# Patient Record
Sex: FEMALE | Race: BLACK OR AFRICAN AMERICAN | Employment: PART TIME | URBAN - METROPOLITAN AREA
[De-identification: names, ages, dates, MRNs, and addresses within clinical notes are randomized per-mention and may not be internally consistent; named-entity substitution may affect disease eponyms.]

---

## 2018-04-04 ENCOUNTER — HOSPITAL ENCOUNTER (OUTPATIENT)
Dept: WOMENS IMAGING | Age: 53
Discharge: HOME | End: 2018-04-04
Attending: OBSTETRICS & GYNECOLOGY | Admitting: OBSTETRICS & GYNECOLOGY

## 2018-04-04 DIAGNOSIS — Z12.31 VISIT FOR SCREENING MAMMOGRAM: ICD-10-CM

## 2018-10-26 ENCOUNTER — HOSPITAL ENCOUNTER (EMERGENCY)
Age: 53
Discharge: HOME | End: 2018-10-26
Attending: EMERGENCY MEDICINE
Payer: COMMERCIAL

## 2018-10-26 VITALS
TEMPERATURE: 97.8 F | WEIGHT: 196 LBS | HEIGHT: 62 IN | BODY MASS INDEX: 36.07 KG/M2 | HEART RATE: 94 BPM | OXYGEN SATURATION: 100 % | RESPIRATION RATE: 16 BRPM | DIASTOLIC BLOOD PRESSURE: 85 MMHG | SYSTOLIC BLOOD PRESSURE: 118 MMHG

## 2018-10-26 DIAGNOSIS — R21 RASH AND OTHER NONSPECIFIC SKIN ERUPTION: Primary | ICD-10-CM

## 2018-10-26 PROCEDURE — 99282 EMERGENCY DEPT VISIT SF MDM: CPT

## 2018-10-26 RX ORDER — PERMETHRIN 50 MG/G
CREAM TOPICAL
Qty: 1 TUBE | Refills: 0 | Status: SHIPPED | OUTPATIENT
Start: 2018-10-26

## 2018-10-26 RX ORDER — HYDROXYZINE 50 MG/1
25 TABLET, FILM COATED ORAL 3 TIMES DAILY PRN
Qty: 20 TABLET | Refills: 0 | Status: SHIPPED | OUTPATIENT
Start: 2018-10-26 | End: 2018-11-05

## 2018-10-26 ASSESSMENT — PAIN DESCRIPTION - PAIN TYPE: TYPE: ACUTE PAIN

## 2018-10-26 ASSESSMENT — PAIN DESCRIPTION - DESCRIPTORS: DESCRIPTORS: ITCHING

## 2018-10-26 NOTE — ED TRIAGE NOTES
Pt presents to ED with complaints of rash near breast and upper back and left shoulder region. Pt states she started tresiba back on Monday but is not a new medication. Denies any new exposure to detergents or soaps. Pt states she was around her sister's dogs so has concerns that the rash may be from them. States they itch. No sores in mouth. No rash to hands and soles of feet. No swelling. No respiratory distress.

## 2018-10-26 NOTE — ED PROVIDER NOTES
Triage Chief Complaint:   Rash (reports rash to chest and back; onset Monday; states she switched back to [place] on Monday but states she thinks the rash was there prior; states she has been on [place] before but never had any difficulty with the medication before; states she washed her face with new soap but no new exposure that she is aware of )    [place]:  Radha Flores is a 47 y.o. female that presents with concern for a rash. It is near the left breast and upper left back and left shoulder. She noted it on Monday, had been petting her sister's dogs and thought maybe it came from them. No known bites. She was started back on her [place] on Monday, been on this previously and has not had a rash with that then. She noted the rash she believes before she restarted it. No nausea or vomiting. No fevers. No mouth lesions. No hand or palm lesions. No foot lesions. No sloughing of skin. It is very itchy. It is not painful. No ulcers or blisters. She used a new facial soap but otherwise no new soaps or detergents. No new exposures that she knows of otherwise. Has not taken anything for the itchiness. 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 Normal chamber sizes. LVH with normal LVSF. Mild MR/TR. Normal sized abdominal aorta at 2.1cm.  Hyperlipidemia     Hypertension     Obesity (BMI 30-39. 9)     Shoulder pain, bilateral     SOB (shortness of breath)      History reviewed. No pertinent surgical history. History reviewed. No pertinent family history.   Social History     Social History    Marital status:      Spouse name: N/A    Number of children: N/A    Years of education: N/A

## 2018-12-02 ENCOUNTER — HOSPITAL ENCOUNTER (EMERGENCY)
Age: 53
Discharge: HOME | End: 2018-12-02
Payer: COMMERCIAL

## 2018-12-02 VITALS
WEIGHT: 187 LBS | BODY MASS INDEX: 34.41 KG/M2 | OXYGEN SATURATION: 98 % | RESPIRATION RATE: 16 BRPM | HEIGHT: 62 IN | SYSTOLIC BLOOD PRESSURE: 167 MMHG | DIASTOLIC BLOOD PRESSURE: 88 MMHG | TEMPERATURE: 98.8 F | HEART RATE: 94 BPM

## 2018-12-02 DIAGNOSIS — R35.0 URINARY FREQUENCY: ICD-10-CM

## 2018-12-02 DIAGNOSIS — M54.50 ACUTE LOW BACK PAIN WITHOUT SCIATICA, UNSPECIFIED BACK PAIN LATERALITY: Primary | ICD-10-CM

## 2018-12-02 LAB
AMORPHOUS: ABNORMAL /HPF
BACTERIA: NEGATIVE /HPF
BILIRUBIN URINE: NEGATIVE MG/DL
BLOOD, URINE: NEGATIVE
CLARITY: ABNORMAL
COLOR: YELLOW
GLUCOSE, URINE: NEGATIVE MG/DL
KETONES, URINE: NEGATIVE MG/DL
LEUKOCYTE ESTERASE, URINE: ABNORMAL
MUCUS: ABNORMAL HPF
NITRITE URINE, QUANTITATIVE: NEGATIVE
PH, URINE: 8 (ref 5–8)
PROTEIN UA: 30 MG/DL
RBC URINE: 5 /HPF (ref 0–6)
SPECIFIC GRAVITY UA: 1.03 (ref 1–1.03)
SQUAMOUS EPITHELIAL: 1 /HPF
TRICHOMONAS: ABNORMAL /HPF
UROBILINOGEN, URINE: 1 MG/DL (ref 0.2–1)
WBC UA: 2 /HPF (ref 0–5)

## 2018-12-02 PROCEDURE — 99283 EMERGENCY DEPT VISIT LOW MDM: CPT

## 2018-12-02 PROCEDURE — 87086 URINE CULTURE/COLONY COUNT: CPT

## 2018-12-02 PROCEDURE — 81001 URINALYSIS AUTO W/SCOPE: CPT

## 2018-12-02 RX ORDER — METHOCARBAMOL 500 MG/1
500 TABLET, FILM COATED ORAL 3 TIMES DAILY
Qty: 9 TABLET | Refills: 0 | Status: SHIPPED | OUTPATIENT
Start: 2018-12-02

## 2018-12-02 RX ORDER — NAPROXEN 500 MG/1
500 TABLET ORAL 2 TIMES DAILY PRN
Qty: 20 TABLET | Refills: 0 | Status: SHIPPED | OUTPATIENT
Start: 2018-12-02

## 2018-12-02 RX ORDER — LIDOCAINE 50 MG/G
1 PATCH TOPICAL DAILY
Qty: 3 PATCH | Refills: 0 | Status: SHIPPED | OUTPATIENT
Start: 2018-12-02

## 2018-12-02 ASSESSMENT — PAIN DESCRIPTION - ORIENTATION: ORIENTATION: MID

## 2018-12-02 ASSESSMENT — PAIN DESCRIPTION - LOCATION: LOCATION: BACK

## 2018-12-02 ASSESSMENT — PAIN DESCRIPTION - PAIN TYPE: TYPE: ACUTE PAIN

## 2018-12-02 ASSESSMENT — PAIN SCALES - GENERAL: PAINLEVEL_OUTOF10: 8

## 2018-12-02 NOTE — ED TRIAGE NOTES
Pt denies any injury, states her back pain started one morning 1 week ago and has not been relieved.  Pt has tried analgesics without relief

## 2018-12-03 NOTE — ED PROVIDER NOTES
status: Never Smoker    Smokeless tobacco: Never Used    Alcohol use No    Drug use: No    Sexual activity: Not on file     Other Topics Concern    Not on file     Social History Narrative    No narrative on file       PHYSICAL EXAM    VITAL SIGNS: BP (!) 167/88   Pulse 94   Temp 98.8 °F (37.1 °C) (Oral)   Resp 16   Ht 5' 2\" (1.575 m)   Wt 187 lb (84.8 kg)   LMP 07/09/2016   SpO2 98%   BMI 34.20 kg/m²    Patient was noted to be afebrile    Constitutional:  Well developed, well nourished. HENT:  Atraumatic, moist mucus membranes. Eyes:  No orbital trauma. No scleral icterus. Neck: No JVD, supple. No enlarged lymph nodes. Cardiovascular:  Normal rate, regular rhythm, no murmurs/rubs/gallops  Respiratory:  No respiratory distress, normal breath sounds. Abdomen: Bowel sounds normal, abdomen soft, no tenderness, no masses. Musculoskeletal:  No edema, no deformities. Back:   - No gross deformity, swelling, or discoloration.    - + left lower Paralumbar tenderness without masses, fluctuance, warmth, or skin changes.  - No localized midline bony tenderness, but rather patient does have diffuse inferior lumbar tenderness to palpation without point tenderness. There is no midline cervical or thoracic tenderness to palpation.  - No change in pain with forward flexion, but increased pain with lateral rotation.  - SLR test negative. No CVA tenderness to percussion    Integument:  Well hydrated, no rash, no pallor. Neurologic:    No obvious neurological deficits. Patient moves without difficulty or weakness. Motor & sensation intact in bilateral lower extremities. Strength is 5/5 in bilateral lower extremities. Intact sensation of lower legs, including normal sensation to light touch of inner thighs, distal legs, feet  5/5 strength adduction thighs, flexion/extension knees, bilateral foot dorsiflexion/extension, all toe extension/curling toes.   2+ patellar reflexes bilaterally    Vascular: Distal pulses and capillary refill intact in bilateral lower extremities      LABS:  Results for orders placed or performed during the hospital encounter of 12/02/18   Urinalysis   Result Value Ref Range    Color, UA YELLOW UYELL    Clarity, UA HAZY (A) CLEAR    Glucose, Urine NEGATIVE NEG MG/DL    Bilirubin Urine NEGATIVE NEG MG/DL    Ketones, Urine NEGATIVE NEG MG/DL    Specific Gravity, UA 1.028 1.001 - 1.035    Blood, Urine NEGATIVE NEG    pH, Urine 8.0 5.0 - 8.0    Protein, UA 30 (A) NEG MG/DL    Urobilinogen, Urine 1 0.2 - 1.0 MG/DL    Nitrite Urine, Quantitative NEGATIVE NEG    Leukocyte Esterase, Urine SMALL (A) NEG    RBC, UA 5 0 - 6 /HPF    WBC, UA 2 0 - 5 /HPF    Bacteria, UA NEGATIVE NEG /HPF    Squam Epithel, UA 1 /HPF    Mucus, UA RARE (A) NEG HPF    Trichomonas, UA NONE SEEN NOSEE /HPF    Amorphous, UA RARE /HPF     Urine culture in process    RADIOLOGY/PROCEDURES    No orders to display            ED COURSE & MEDICAL DECISION MAKING       Vital signs and nursing notes reviewed during ED course. I have independently evaluated this patient. Supervising physician present in the Emergency Department, available for consultation, throughout entirety of patient care. History and exam is consistent with musculoskeletal back pain. While in the ED today, Did offer patient imaging today to rule out compression fracture but she declines. I do have low suspicion for this as patient has no history of osteopenia, no history of trauma, and has no point tenderness of the lumbar spine, but rather diffuse and paralumbar tenderness to palpation. Patient would prefer to try conservative treatment and if no improvement follow up with her PCP for outpatient imaging. Do find this plan reasonable. Patient is neurologically intact on exam.  No signs of cauda equina today. Presentation not consistent with epidural abscess, kidney stone, or pyelonephritis.   Urinalysis obtained and reveals no evidence of urinary tract

## 2018-12-04 LAB
CULTURE: NORMAL
Lab: NORMAL
REPORT STATUS: NORMAL
SPECIMEN: NORMAL
TOTAL COLONY COUNT: NORMAL

## 2019-02-14 ENCOUNTER — HOSPITAL ENCOUNTER (OUTPATIENT)
Dept: PHYSICAL THERAPY | Age: 54
Setting detail: THERAPIES SERIES
Discharge: HOME | End: 2019-02-14
Payer: COMMERCIAL

## 2019-02-14 PROCEDURE — 97161 PT EVAL LOW COMPLEX 20 MIN: CPT

## 2019-02-14 ASSESSMENT — PAIN DESCRIPTION - PROGRESSION: CLINICAL_PROGRESSION: GRADUALLY IMPROVING

## 2019-02-14 ASSESSMENT — PAIN DESCRIPTION - PAIN TYPE: TYPE: ACUTE PAIN

## 2019-02-14 ASSESSMENT — PAIN SCALES - GENERAL: PAINLEVEL_OUTOF10: 0

## 2019-02-14 ASSESSMENT — PAIN DESCRIPTION - FREQUENCY: FREQUENCY: INTERMITTENT

## 2019-02-14 ASSESSMENT — PAIN DESCRIPTION - ORIENTATION: ORIENTATION: MID

## 2019-02-14 ASSESSMENT — PAIN DESCRIPTION - LOCATION: LOCATION: BACK

## 2019-02-15 ASSESSMENT — PAIN DESCRIPTION - FREQUENCY: FREQUENCY: INTERMITTENT

## 2019-02-15 ASSESSMENT — PAIN DESCRIPTION - PAIN TYPE: TYPE: ACUTE PAIN

## 2019-02-15 ASSESSMENT — PAIN DESCRIPTION - ORIENTATION: ORIENTATION: MID

## 2019-02-15 ASSESSMENT — PAIN DESCRIPTION - LOCATION: LOCATION: BACK

## 2019-02-15 ASSESSMENT — PAIN DESCRIPTION - PROGRESSION: CLINICAL_PROGRESSION: GRADUALLY IMPROVING

## 2019-02-17 ENCOUNTER — HOSPITAL ENCOUNTER (EMERGENCY)
Age: 54
Discharge: HOME | End: 2019-02-17
Payer: COMMERCIAL

## 2019-02-17 VITALS
RESPIRATION RATE: 17 BRPM | HEART RATE: 78 BPM | SYSTOLIC BLOOD PRESSURE: 126 MMHG | DIASTOLIC BLOOD PRESSURE: 73 MMHG | BODY MASS INDEX: 33.13 KG/M2 | OXYGEN SATURATION: 99 % | TEMPERATURE: 98.6 F | HEIGHT: 63 IN | WEIGHT: 187 LBS

## 2019-02-17 DIAGNOSIS — R30.0 DYSURIA: Primary | ICD-10-CM

## 2019-02-17 LAB
BACTERIA: ABNORMAL /HPF
BILIRUBIN URINE: NEGATIVE MG/DL
BLOOD, URINE: NEGATIVE
CLARITY: CLEAR
COLOR: YELLOW
GLUCOSE, URINE: NEGATIVE MG/DL
KETONES, URINE: NEGATIVE MG/DL
LEUKOCYTE ESTERASE, URINE: ABNORMAL
MUCUS: ABNORMAL HPF
NITRITE URINE, QUANTITATIVE: NEGATIVE
PH, URINE: 6 (ref 5–8)
PREGNANCY, URINE: NEGATIVE
PROTEIN UA: NEGATIVE MG/DL
RBC URINE: 1 /HPF (ref 0–6)
SPECIFIC GRAVITY UA: 1.03 (ref 1–1.03)
SPECIFIC GRAVITY, URINE: 1.03 (ref 1–1.03)
SQUAMOUS EPITHELIAL: 1 /HPF
TRICHOMONAS: ABNORMAL /HPF
UROBILINOGEN, URINE: NORMAL MG/DL (ref 0.2–1)
WBC UA: 3 /HPF (ref 0–5)

## 2019-02-17 PROCEDURE — 99283 EMERGENCY DEPT VISIT LOW MDM: CPT

## 2019-02-17 PROCEDURE — 81001 URINALYSIS AUTO W/SCOPE: CPT

## 2019-02-17 PROCEDURE — 81025 URINE PREGNANCY TEST: CPT

## 2019-02-17 RX ORDER — PHENAZOPYRIDINE HYDROCHLORIDE 200 MG/1
200 TABLET, FILM COATED ORAL 3 TIMES DAILY PRN
Qty: 6 TABLET | Refills: 0 | Status: SHIPPED | OUTPATIENT
Start: 2019-02-17 | End: 2019-02-19

## 2019-02-17 RX ORDER — CEPHALEXIN 500 MG/1
500 CAPSULE ORAL 2 TIMES DAILY
Qty: 14 CAPSULE | Refills: 0 | Status: SHIPPED | OUTPATIENT
Start: 2019-02-17 | End: 2019-02-24

## 2020-10-01 ENCOUNTER — APPOINTMENT (OUTPATIENT)
Dept: GENERAL RADIOLOGY | Age: 55
End: 2020-10-01
Payer: COMMERCIAL

## 2020-10-01 ENCOUNTER — HOSPITAL ENCOUNTER (EMERGENCY)
Age: 55
Discharge: HOME | End: 2020-10-02
Payer: COMMERCIAL

## 2020-10-01 VITALS
SYSTOLIC BLOOD PRESSURE: 140 MMHG | RESPIRATION RATE: 14 BRPM | DIASTOLIC BLOOD PRESSURE: 69 MMHG | TEMPERATURE: 98.3 F | OXYGEN SATURATION: 100 % | HEART RATE: 87 BPM

## 2020-10-01 PROCEDURE — 99283 EMERGENCY DEPT VISIT LOW MDM: CPT

## 2020-10-01 PROCEDURE — 73030 X-RAY EXAM OF SHOULDER: CPT

## 2020-10-01 ASSESSMENT — PAIN DESCRIPTION - ORIENTATION: ORIENTATION: RIGHT

## 2020-10-01 ASSESSMENT — PAIN DESCRIPTION - PAIN TYPE: TYPE: ACUTE PAIN

## 2020-10-01 ASSESSMENT — PAIN DESCRIPTION - LOCATION: LOCATION: SHOULDER

## 2020-10-02 NOTE — ED NOTES
Discharge instructions understood as stated by patient. All questions answered.      Janie Marcos RN  10/02/20 0476

## 2020-10-02 NOTE — ED PROVIDER NOTES
EMERGENCY DEPARTMENT ENCOUNTER      PCP: [id number] [facility]    Chief Complaint   Patient presents with    Shoulder Injury     right       This patient was not evaluated by the attending physician. I have independently evaluated this patient. HPI    Batool Siddiqui is a 48 y.o. female who presents with right shoulder pain. Context is patient lifted a heavy box up onto a shelf and then used her right hand to move the box while it was above her head and felt immediate pain in her right shoulder. Onset was a few hours prior to arrival tonight. The duration of pain has been constant since the onset. The pain does not radiate. The pain worsens with right shoulder movement and palpation. Patient denies fever, chills, distal numbness, tingling, weakness, or functional deficit. Patient denies other injuries. Patient denies chest pain, shortness of breath, neck pain. REVIEW OF SYSTEMS    General: Denies fever or chills  Cardiac: Denies chest pain  Pulmonary: Denies shortness of breath, wheezes, or difficulty breathing  GI: Denies abdominal pain, vomiting, or diarrhea  : No dysuria or hematuria  Musculoskeletal: See HPI; Denies any other musculoskeletal injuries, including chest wall and back. All other review of systems are negative  See HPI and nursing notes for additional information     PAST MEDICAL & SURGICAL HISTORY    Past Medical History:   Diagnosis Date    Diabetes mellitus ([facility] [address]     History of nuclear stress test 06/23/2016    cardiolite-normal,EF65%    Hx of echocardiogram 06/23/2016    EF >55%. Normal chamber sizes. LVH with normal LVSF. Mild MR/TR. Normal sized abdominal aorta at 2.1cm.  Hyperlipidemia     Hypertension     Obesity (BMI 30-39. 9)     Shoulder pain, bilateral     SOB (shortness of breath)      History reviewed. No pertinent surgical history.     CURRENT MEDICATIONS    Current Outpatient Rx   Medication Sig Dispense Refill    lidocaine (LIDODERM) 5 % Place 1 patch onto the skin daily 12 hours on, 12 hours off. 3 patch 0    methocarbamol (ROBAXIN) 500 MG tablet Take 1 tablet by mouth 3 times daily As needed for muscle spasm. 9 tablet 0    naproxen (NAPROSYN) 500 MG tablet Take 1 tablet by mouth 2 times daily as needed for Pain 20 tablet 0    permethrin (ELIMITE) 5 % cream Apply topically as directed 1 Tube 0    hydrocortisone acetate 1 % CREA Apply 1 Tube topically three times daily Until rash resolves, no longer than 3 weeks. 1 Tube 0    insulin NPH (HUMULIN N) 100 UNIT/ML injection vial Inject 50 Units into the skin 2 times daily (before meals)      insulin regular (HUMULIN R) 100 UNIT/ML injection Inject 70 Units into the skin See Admin Instructions With lunch      losartan (COZAAR) 50 MG tablet Take 50 mg by mouth 2 times daily      simvastatin (ZOCOR) 20 MG tablet Take 20 mg by mouth nightly      glucose blood VI test strips (ASCENSIA AUTODISC VI;ONE TOUCH ULTRA TEST VI) strip 1 each by In Vitro route daily As needed.          ALLERGIES    Allergies   Allergen Reactions    Nuts [Peanut-Containing Drug Products] Anaphylaxis    Shellfish-Derived Products Anaphylaxis    Lisinopril Other (See Comments)     lathargic    Metformin And Related Diarrhea       SOCIAL & FAMILY HISTORY    Social History     Socioeconomic History    Marital status:      Spouse name: None    Number of children: None    Years of education: None    Highest education level: None   Occupational History    None   Social Needs    Financial resource strain: None    Food insecurity     Worry: None     Inability: None    Transportation needs     Medical: None     Non-medical: None   Tobacco Use    Smoking status: Never Smoker    Smokeless tobacco: Never Used   Substance and Sexual Activity    Alcohol use: No     Alcohol/week: 0.0 standard drinks    Drug use: No    Sexual activity: None   Lifestyle    Physical activity     Days per week: None Minutes per session: None    Stress: None   Relationships    Social connections     Talks on phone: None     Gets together: None     Attends Gnosticist service: None     Active member of club or organization: None     Attends meetings of clubs or organizations: None     Relationship status: None    Intimate partner violence     Fear of current or ex partner: None     Emotionally abused: None     Physically abused: None     Forced sexual activity: None   Other Topics Concern    None   Social History Narrative    None     History reviewed. No pertinent family history. PHYSICAL EXAM    VITAL SIGNS: BP (!) 140/69   Pulse 87   Temp 98.3 °F (36.8 °C) (Oral)   Resp 14   LMP 07/09/2016   SpO2 100%   Constitutional:  Well developed, Appears comfortable    Musculoskeletal:    Neck:  No gross swelling or discoloration on inspection. No tenderness to palpation or palpable defect. Full range of motion without pain. Right Shoulder Exam:   -Inspection:   No obvious defects, deformities, or discoloration. Skin intact   - Palpation: No swelling     No redness, or warmth     + tenderness to palpation of right AC joint as well as trapezius musculature. No clavicular, scapular, humerus tenderness to palpation        -ROM:   Full active ROM   -Provocative tests:  Negative Neer, Barriga Little, Drop Arm    No swelling, discoloration, tenderness to palpation, or range of motion deficit of the ipsilateral elbow. Strength 5/5 and extremity is distally neurovascularly intact. Vascular: Radial pulses 2+ and equal bilaterally  Integument:  Well hydrated, no rash   Neurologic:  Alert and oriented. Distal sensation intact. No functional deficits of elbows, wrists, hands, or fingers. Psychiatric: Cooperative, pleasant affect        RADIOLOGY/PROCEDURES    XR SHOULDER RIGHT (MIN 2 VIEWS)   Final Result   No acute abnormality.                     ED COURSE & MEDICAL DECISION MAKING       Vital signs and nursing notes

## 2020-11-18 ENCOUNTER — OFFICE VISIT (OUTPATIENT)
Dept: ORTHOPEDIC SURGERY | Age: 55
End: 2020-11-18
Payer: COMMERCIAL

## 2020-11-18 VITALS
HEART RATE: 74 BPM | HEIGHT: 63 IN | RESPIRATION RATE: 16 BRPM | WEIGHT: 187 LBS | OXYGEN SATURATION: 97 % | BODY MASS INDEX: 33.13 KG/M2

## 2020-11-18 PROCEDURE — G8427 DOCREV CUR MEDS BY ELIG CLIN: HCPCS | Performed by: PHYSICIAN ASSISTANT

## 2020-11-18 PROCEDURE — 99242 OFF/OP CONSLTJ NEW/EST SF 20: CPT | Performed by: PHYSICIAN ASSISTANT

## 2020-11-18 PROCEDURE — G8417 CALC BMI ABV UP PARAM F/U: HCPCS | Performed by: PHYSICIAN ASSISTANT

## 2020-11-18 PROCEDURE — 20610 DRAIN/INJ JOINT/BURSA W/O US: CPT | Performed by: PHYSICIAN ASSISTANT

## 2020-11-18 PROCEDURE — G8484 FLU IMMUNIZE NO ADMIN: HCPCS | Performed by: PHYSICIAN ASSISTANT

## 2020-11-18 NOTE — PATIENT INSTRUCTIONS
Continue weight bear as tolerated  Continue range of motion  Ice and elevate as needed  Tylenol or Motrin for pain  Injection given today in the office   Rest both knee for the next 24-48 hours  Follow up as needed

## 2020-11-18 NOTE — PROGRESS NOTES
Review of Systems   Constitutional: Negative. HENT: Negative. Eyes: Negative. Respiratory: Negative. Cardiovascular: Negative. Gastrointestinal: Negative. Genitourinary: Negative. Musculoskeletal: Positive for arthralgias, gait problem and joint swelling. Skin: Negative. Psychiatric/Behavioral: Negative. HPI:  Nabor Newsome is a 48 y.o. female who complains of bilateral knee pain aching in nature. The left knee is hurting greater than the right knee today. She rates her pain at a 6/10. She states that in the past she has had fluid drained off of her knee but she cannot remember which one actually. No surgeries and no cortisone injections recently. The patient was referred by Chantelle Benítez MD   for Bilateral knee pain. Past Medical History:   Diagnosis Date    Diabetes mellitus ([facility] [address]     History of nuclear stress test 06/23/2016    cardiolite-normal,EF65%    Hx of echocardiogram 06/23/2016    EF >55%. Normal chamber sizes. LVH with normal LVSF. Mild MR/TR. Normal sized abdominal aorta at 2.1cm.  Hyperlipidemia     Hypertension     Obesity (BMI 30-39. 9)     Shoulder pain, bilateral     SOB (shortness of breath)        No past surgical history on file. No family history on file.     Social History     Socioeconomic History    Marital status:      Spouse name: None    Number of children: None    Years of education: None    Highest education level: None   Occupational History    None   Social Needs    Financial resource strain: None    Food insecurity     Worry: None     Inability: None    Transportation needs     Medical: None     Non-medical: None   Tobacco Use    Smoking status: Never Smoker    Smokeless tobacco: Never Used   Substance and Sexual Activity    Alcohol use: No     Alcohol/week: 0.0 standard drinks    Drug use: No    Sexual activity: None   Lifestyle    Physical activity     Days per week: None     Minutes per above      Physical Exam:   Pulse 74   Resp 16   Ht 5' 2.5\" (1.588 m)   Wt 187 lb (84.8 kg)   LMP 07/09/2016   SpO2 97%   BMI 33.66 kg/m²        Gait is Antalgic. The patient can bear weight on the injured extremity. Gen/Psych:Examination reveals a pleasant individual in no acute distress. The patient is oriented to time, place and person. The patient's mood and affect are appropriate. Patient appears well nourished. Body habitus is overwieght     Lymph:  no lymphedema in bilateral lower extremities     Skin intact in bilateral lower extremities with no ulcerations, lesions, rash, erythema. Vascular: There are no varicosities in bilateral lower extremities, sensation intact to light touch over bilateral lower extremities. bilateral leg/knee exam:  Leg alignment:     neutral  Quadriceps/hamstring atrophy:   no  Knee effusion:    no   Knee erythema:   no  ROM:     0-125 degrees  Varus laxity at 0 and 30 deg's: no  Valguslaxity at 0 and 30 deg's: no  Recurvatum:    no  Tenderness at:   Medial joint line    Bilateral Knee strength is 5/5 flexion and extension  There is + L2-S1 motor and sensory function in bilateral lower extremities    Outside record review: office notes     Imaging studies:  4 views the left knee and 4 views of the right knee were taken and reviewed in the office today, left knee x-rays show mild degenerative change, no fracture, no dislocation. Right knee x-ray show mild degenerative change with no fractures and no dislocations. No significant joint effusion seen on either knee. The official read and interpretation of these x-rays will be done by the the [facility]     Impression:     Diagnosis Orders   1. Arthritis of knee, right     2. Arthritis of knee, left             Plan:  Natural history and expected course discussed. Questions answered.   Patient Instructions   Continue weight bear as tolerated  Continue range of motion  Ice and elevate as Complications:  None, the patient tolerated the procedure well. Instructions: The patient was advised to rest the knee and decrease activity for the next 24 to 48 hours. May use prescription or OTC pain relievers as needed for any post-injection pain as well as ice.

## 2020-11-18 NOTE — PROGRESS NOTES
Patient states both knee are hurting. Left > Right. Patient states that she has had fluid taken off the right one a couple of years ago. Patient states that the bilateral knee hurt around the whole knee. Patient states she has had no injuries to the knees and no cortisone injection and no surgery.

## 2020-11-29 ASSESSMENT — ENCOUNTER SYMPTOMS
GASTROINTESTINAL NEGATIVE: 1
RESPIRATORY NEGATIVE: 1
EYES NEGATIVE: 1

## 2020-12-30 ENCOUNTER — OFFICE VISIT (OUTPATIENT)
Dept: ORTHOPEDIC SURGERY | Age: 55
End: 2020-12-30
Payer: COMMERCIAL

## 2020-12-30 VITALS
BODY MASS INDEX: 33.13 KG/M2 | HEART RATE: 76 BPM | RESPIRATION RATE: 16 BRPM | OXYGEN SATURATION: 97 % | WEIGHT: 187 LBS | HEIGHT: 63 IN

## 2020-12-30 DIAGNOSIS — M17.11 ARTHRITIS OF KNEE, RIGHT: Primary | ICD-10-CM

## 2020-12-30 DIAGNOSIS — M17.12 ARTHRITIS OF KNEE, LEFT: ICD-10-CM

## 2020-12-30 PROCEDURE — 99211 OFF/OP EST MAY X REQ PHY/QHP: CPT | Performed by: PHYSICIAN ASSISTANT

## 2020-12-30 PROCEDURE — G8427 DOCREV CUR MEDS BY ELIG CLIN: HCPCS | Performed by: PHYSICIAN ASSISTANT

## 2020-12-30 PROCEDURE — G8417 CALC BMI ABV UP PARAM F/U: HCPCS | Performed by: PHYSICIAN ASSISTANT

## 2020-12-30 NOTE — PATIENT INSTRUCTIONS
Continue to weight bear as tolerated  Continue range of motion  Ice and elevate as needed  Tylenol or Motrin for pain  Follow up as needed  Work on weight lost

## 2021-01-05 PROBLEM — M17.11 ARTHRITIS OF KNEE, RIGHT: Status: ACTIVE | Noted: 2021-01-05

## 2021-01-05 PROBLEM — M17.12 ARTHRITIS OF KNEE, LEFT: Status: ACTIVE | Noted: 2021-01-05

## 2021-01-05 ASSESSMENT — ENCOUNTER SYMPTOMS
GASTROINTESTINAL NEGATIVE: 1
RESPIRATORY NEGATIVE: 1
EYES NEGATIVE: 1

## 2021-01-05 NOTE — PROGRESS NOTES
I reviewed and agree with the portions of the HPI, review of systems, vital documentation and plan performed by my staff and have added/addended where appropriate. Review of Systems   Constitutional: Negative. HENT: Negative. Eyes: Negative. Respiratory: Negative. Cardiovascular: Negative. Gastrointestinal: Negative. Genitourinary: Negative. Musculoskeletal: Positive for arthralgias, joint swelling and myalgias. Skin: Negative. Neurological: Negative. Psychiatric/Behavioral: Negative. HPI:  Geovanni Conroy is a 48y.o. year old female who is here today following up on her injections in both knees that were completed on Nov. 18, 2020. She states that the pain has improved although she is still having some pain. Past Medical History:   Diagnosis Date    Diabetes mellitus ([facility] [address]     History of nuclear stress test 06/23/2016    cardiolite-normal,EF65%    Hx of echocardiogram 06/23/2016    EF >55%. Normal chamber sizes. LVH with normal LVSF. Mild MR/TR. Normal sized abdominal aorta at 2.1cm.  Hyperlipidemia     Hypertension     Obesity (BMI 30-39. 9)     Shoulder pain, bilateral     SOB (shortness of breath)        No past surgical history on file. No family history on file.     Social History     Socioeconomic History    Marital status:      Spouse name: None    Number of children: None    Years of education: None    Highest education level: None   Occupational History    None   Social Needs    Financial resource strain: None    Food insecurity     Worry: None     Inability: None    Transportation needs     Medical: None     Non-medical: None   Tobacco Use    Smoking status: Never Smoker    Smokeless tobacco: Never Used   Substance and Sexual Activity    Alcohol use: No     Alcohol/week: 0.0 standard drinks    Drug use: No    Sexual activity: None   Lifestyle    Physical activity     Days per week: None     Minutes per session: None    Stress: None   Relationships    Social connections     Talks on phone: None     Gets together: None     Attends Religion service: None     Active member of club or organization: None     Attends meetings of clubs or organizations: None     Relationship status: None    Intimate partner violence     Fear of current or ex partner: None     Emotionally abused: None     Physically abused: None     Forced sexual activity: None   Other Topics Concern    None   Social History Narrative    None       Current Outpatient Medications   Medication Sig Dispense Refill    lidocaine (LIDODERM) 5 % Place 1 patch onto the skin daily 12 hours on, 12 hours off. 3 patch 0    methocarbamol (ROBAXIN) 500 MG tablet Take 1 tablet by mouth 3 times daily As needed for muscle spasm. 9 tablet 0    naproxen (NAPROSYN) 500 MG tablet Take 1 tablet by mouth 2 times daily as needed for Pain 20 tablet 0    permethrin (ELIMITE) 5 % cream Apply topically as directed 1 Tube 0    hydrocortisone acetate 1 % CREA Apply 1 Tube topically three times daily Until rash resolves, no longer than 3 weeks. 1 Tube 0    insulin NPH (HUMULIN N) 100 UNIT/ML injection vial Inject 50 Units into the skin 2 times daily (before meals)      insulin regular (HUMULIN R) 100 UNIT/ML injection Inject 70 Units into the skin See Admin Instructions With lunch      losartan (COZAAR) 50 MG tablet Take 50 mg by mouth 2 times daily      simvastatin (ZOCOR) 20 MG tablet Take 20 mg by mouth nightly      glucose blood VI test strips (ASCENSIA AUTODISC VI;ONE TOUCH ULTRA TEST VI) strip 1 each by In Vitro route daily As needed. No current facility-administered medications for this visit.         Allergies   Allergen Reactions    Nuts [Peanut-Containing Drug Products] Anaphylaxis    Shellfish-Derived Products Anaphylaxis    Lisinopril Other (See Comments)     lathargic    Metformin And Related Diarrhea       Review of Systems:  See above      Physical Exam:   Pulse 76 Resp 16   Ht 5' 2.5\" (1.588 m)   Wt 187 lb (84.8 kg)   LMP 07/09/2016   SpO2 97%   BMI 33.66 kg/m²        Gait is Antalgic. The patient can bear weight on the injured extremity. Gen/Psych:Examination reveals a pleasant individual in no acute distress. The patient is oriented to time, place and person. The patient's mood and affect are appropriate. Patient appears well nourished. Body habitus is overweight     Lymph:  no lymphedema in bilateral lower extremities     Skin intact in bilateral lower extremities with no ulcerations, lesions, rash, erythema. Vascular: There are no varicosities in bilateral lower extremities, sensation intact to light touch over bilateral lower extremities. bilateral leg/knee exam:  Leg alignment:     neutral  Quadriceps/hamstring atrophy:   no  Knee effusion:    no   Knee erythema:   no  ROM:     Full, 0-120 degrees  Varus laxity at 0 and 30 deg's: no  Valguslaxity at 0 and 30 deg's: no  Recurvatum:    no  Tenderness at:   Mild medial and lateral joint pain    Bilateral Knee strength is 5/5 flexion and extension  There is + L2-S1 motor and sensory function in bilateral lower extremities        Impression:     Diagnosis Orders   1. Arthritis of knee, right     2. Arthritis of knee, left             Plan:  Natural history and expected course discussed. Questions answered.   Patient Instructions   Continue to weight bear as tolerated  Continue range of motion  Ice and elevate as needed  Tylenol or Motrin for pain  Follow up as needed  Work on weight lost

## 2021-06-01 ENCOUNTER — OFFICE VISIT (OUTPATIENT)
Dept: ORTHOPEDIC SURGERY | Age: 56
End: 2021-06-01
Payer: COMMERCIAL

## 2021-06-01 VITALS
BODY MASS INDEX: 33.13 KG/M2 | OXYGEN SATURATION: 100 % | HEIGHT: 63 IN | RESPIRATION RATE: 16 BRPM | WEIGHT: 187 LBS | HEART RATE: 82 BPM

## 2021-06-01 DIAGNOSIS — S80.12XA CONTUSION OF LEFT LOWER LEG, INITIAL ENCOUNTER: Primary | ICD-10-CM

## 2021-06-01 PROCEDURE — 1036F TOBACCO NON-USER: CPT | Performed by: PHYSICIAN ASSISTANT

## 2021-06-01 PROCEDURE — 99213 OFFICE O/P EST LOW 20 MIN: CPT | Performed by: PHYSICIAN ASSISTANT

## 2021-06-01 PROCEDURE — G8417 CALC BMI ABV UP PARAM F/U: HCPCS | Performed by: PHYSICIAN ASSISTANT

## 2021-06-01 PROCEDURE — 3017F COLORECTAL CA SCREEN DOC REV: CPT | Performed by: PHYSICIAN ASSISTANT

## 2021-06-01 PROCEDURE — G8427 DOCREV CUR MEDS BY ELIG CLIN: HCPCS | Performed by: PHYSICIAN ASSISTANT

## 2021-06-01 ASSESSMENT — ENCOUNTER SYMPTOMS
RESPIRATORY NEGATIVE: 1
ALLERGIC/IMMUNOLOGIC NEGATIVE: 1
EYES NEGATIVE: 1
GASTROINTESTINAL NEGATIVE: 1

## 2021-06-01 NOTE — PROGRESS NOTES
[facility]    HPI:  Addy Scott is a 55 y.o. presents for evaluation of her distal left lower leg pain. Patient states she had a desk fall and hit her distal shin approximately 1 month ago. She states her pain is 6/10 and describes it as a throbbing sensation. Patient states she was concerned for a fracture. She states she has been taking ibuprofen which has helped some. Patient states her pain is constant and nothing worsens her pain. She denies any new injuries or past surgeries of her left lower leg. Past Medical History:   Diagnosis Date    Diabetes mellitus ([facility] [address]     History of nuclear stress test 06/23/2016    cardiolite-normal,EF65%    Hx of echocardiogram 06/23/2016    EF >55%. Normal chamber sizes. LVH with normal LVSF. Mild MR/TR. Normal sized abdominal aorta at 2.1cm.  Hyperlipidemia     Hypertension     Obesity (BMI 30-39. 9)     Shoulder pain, bilateral     SOB (shortness of breath)        No past surgical history on file. No family history on file. Social History     Socioeconomic History    Marital status:      Spouse name: None    Number of children: None    Years of education: None    Highest education level: None   Occupational History    None   Tobacco Use    Smoking status: Never Smoker    Smokeless tobacco: Never Used   Vaping Use    Vaping Use: Never used   Substance and Sexual Activity    Alcohol use: No     Alcohol/week: 0.0 standard drinks    Drug use: No    Sexual activity: None   Other Topics Concern    None   Social History Narrative    None     Social Determinants of Health     Financial Resource Strain:     Difficulty of Paying Living Expenses:    Food Insecurity:     Worried About Running Out of Food in the Last Year:     Ran Out of Food in the Last Year:    Transportation Needs:     Lack of Transportation (Medical):      Lack of Transportation (Non-Medical):    Physical Activity:     Days of Exercise per Week:     Minutes of Exercise per Session:    Stress:     Feeling of Stress :    Social Connections:     Frequency of Communication with Friends and Family:     Frequency of Social Gatherings with Friends and Family:     Attends [employer] Services:     Active Member of Clubs or Organizations:     Attends Club or Organization Meetings:     Marital Status:    Intimate Partner Violence:     Fear of Current or Ex-Partner:     Emotionally Abused:     Physically Abused:     Sexually Abused:        Current Outpatient Medications   Medication Sig Dispense Refill    lidocaine (LIDODERM) 5 % Place 1 patch onto the skin daily 12 hours on, 12 hours off. 3 patch 0    methocarbamol (ROBAXIN) 500 MG tablet Take 1 tablet by mouth 3 times daily As needed for muscle spasm. 9 tablet 0    naproxen (NAPROSYN) 500 MG tablet Take 1 tablet by mouth 2 times daily as needed for Pain 20 tablet 0    permethrin (ELIMITE) 5 % cream Apply topically as directed 1 Tube 0    hydrocortisone acetate 1 % CREA Apply 1 Tube topically three times daily Until rash resolves, no longer than 3 weeks. 1 Tube 0    insulin NPH (HUMULIN N) 100 UNIT/ML injection vial Inject 50 Units into the skin 2 times daily (before meals)      insulin regular (HUMULIN R) 100 UNIT/ML injection Inject 70 Units into the skin See Admin Instructions With lunch      losartan (COZAAR) 50 MG tablet Take 50 mg by mouth 2 times daily      simvastatin (ZOCOR) 20 MG tablet Take 20 mg by mouth nightly      glucose blood VI test strips (ASCENSIA AUTODISC VI;ONE TOUCH ULTRA TEST VI) strip 1 each by In Vitro route daily As needed. No current facility-administered medications for this visit.        Allergies   Allergen Reactions    Nuts [Peanut-Containing Drug Products] Anaphylaxis    Shellfish-Derived Products Anaphylaxis    Lisinopril Other (See Comments)     lathargic    Metformin And Related Diarrhea       Review of Systems:    Review of Systems anterior drawer negative  [facility] test: negative  Strength: DF/PF: 5/5    Imagin views of the tibia and fibula were obtained which showed no acute fracture or dislocation. The official read and interpretation of these x-rays will be done by the the [facility]. Please see their impression below. Impression   No acute osseous abnormality in the left leg.         Impression:    1. Contusion of left lower leg    Plan:     The patient was seen in clinic for evaluation of her lower leg injury. Patient states she had a desk fall on her lower leg approximately 1 month ago and states she was concerned about a fracture. Xray imaging of the patient's tibia and fibula was obtained which showed no acute fracture or dislocation. On physical exam, area of ecchymosis was seen on the anterior distal lower leg with mild swelling. No erythema or streaking erythema seen. Patient was able to dorsiflex and plantarflex the left ankle with no difficulty. Capillary refill less than 3. Sensation intact to light touch. Patient states she does have history of chronic ankle and knee pain which she states has not changed from her baseline. It was discussed with the patient at this time, it appears she has a contusion of her left lower leg. Patient was informed that she can follow-up as needed at this time. I informed patient that she has any worsening pain or any concerns that she can call the office at any time. Continue to weight bear as tolerated  Continue range of motion as tolerated  Ice and elevate as needed  May take Tylenol or Motrin for pain as needed  Follow-up in as needed    Please note this report has been partially produced using speech recognition Dragon software and may contain errors related to that system including errors in grammar, punctuation, and spelling, as well as words and phrases that may be inappropriate.  If there are any questions or concerns please feel free to contact the dictating provider for clarification.

## 2021-06-01 NOTE — PROGRESS NOTES
Patient is in the office today with left shin pain. 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

## 2021-06-09 ENCOUNTER — OFFICE VISIT (OUTPATIENT)
Dept: ORTHOPEDIC SURGERY | Age: 56
End: 2021-06-09
Payer: COMMERCIAL

## 2021-06-09 VITALS — HEART RATE: 87 BPM | BODY MASS INDEX: 34.78 KG/M2 | WEIGHT: 189 LBS | HEIGHT: 62 IN | OXYGEN SATURATION: 98 %

## 2021-06-09 DIAGNOSIS — M17.12 ARTHRITIS OF KNEE, LEFT: ICD-10-CM

## 2021-06-09 PROCEDURE — 20610 DRAIN/INJ JOINT/BURSA W/O US: CPT | Performed by: PHYSICIAN ASSISTANT

## 2021-06-09 PROCEDURE — 3017F COLORECTAL CA SCREEN DOC REV: CPT | Performed by: PHYSICIAN ASSISTANT

## 2021-06-09 PROCEDURE — G8428 CUR MEDS NOT DOCUMENT: HCPCS | Performed by: PHYSICIAN ASSISTANT

## 2021-06-09 PROCEDURE — G8417 CALC BMI ABV UP PARAM F/U: HCPCS | Performed by: PHYSICIAN ASSISTANT

## 2021-06-09 PROCEDURE — 1036F TOBACCO NON-USER: CPT | Performed by: PHYSICIAN ASSISTANT

## 2021-06-09 PROCEDURE — 99212 OFFICE O/P EST SF 10 MIN: CPT | Performed by: PHYSICIAN ASSISTANT

## 2021-06-09 ASSESSMENT — ENCOUNTER SYMPTOMS
GASTROINTESTINAL NEGATIVE: 1
EYES NEGATIVE: 1
RESPIRATORY NEGATIVE: 1

## 2021-06-09 NOTE — PROGRESS NOTES
I reviewed and agree with the portions of the HPI, review of systems, vital documentation and plan performed by my staff and have added/addended where appropriate. Review of Systems   Constitutional: Negative. HENT: Negative. Eyes: Negative. Respiratory: Negative. Cardiovascular: Negative. Gastrointestinal: Negative. Genitourinary: Negative. Musculoskeletal: Positive for arthralgias, joint swelling and myalgias. Skin: Negative. Neurological: Negative. Psychiatric/Behavioral: Negative. HPI:  Martha Burns very pleasant 81-year-old female who has been seen in this office before for bilateral knee arthritis and had steroid injections in both knees. Steroid injections did help her pain but her pain is coming back now the left is worse than the right. She does have a history of diabetes and states that her blood sugar did go up significantly after she had shots in both knees last time therefore she would like to proceed with 1 knee at a time. She also complains little bit about right hand pain over the dorsal aspect of her right hand between her second and third metacarpals with no known injury but it has been going on for about 3 or 4 weeks. Past Medical History:   Diagnosis Date    Diabetes mellitus ([facility] [address]     History of nuclear stress test 06/23/2016    cardiolite-normal,EF65%    Hx of echocardiogram 06/23/2016    EF >55%. Normal chamber sizes. LVH with normal LVSF. Mild MR/TR. Normal sized abdominal aorta at 2.1cm.  Hyperlipidemia     Hypertension     Obesity (BMI 30-39. 9)     Shoulder pain, bilateral     SOB (shortness of breath)        No past surgical history on file. No family history on file.     Social History     Socioeconomic History    Marital status:      Spouse name: Not on file    Number of children: Not on file    Years of education: Not on file    Highest education level: Not on file   Occupational History    Not on file regular (HUMULIN R) 100 UNIT/ML injection Inject 70 Units into the skin See Admin Instructions With lunch      losartan (COZAAR) 50 MG tablet Take 50 mg by mouth 2 times daily      simvastatin (ZOCOR) 20 MG tablet Take 20 mg by mouth nightly      glucose blood VI test strips (ASCENSIA AUTODISC VI;ONE TOUCH ULTRA TEST VI) strip 1 each by In Vitro route daily As needed. No current facility-administered medications for this visit. Allergies   Allergen Reactions    Nuts [Peanut-Containing Drug Products] Anaphylaxis    Shellfish-Derived Products Anaphylaxis    Lisinopril Other (See Comments)     lathargic    Metformin And Related Diarrhea       Review of Systems:  See above      Physical Exam:   Pulse 87   Ht 5' 2\" (1.575 m)   Wt 189 lb (85.7 kg)   LMP 07/09/2016   SpO2 98%   BMI 34.57 kg/m²        Gait is Antalgic. The patient can bear weight on the injured extremity. Gen/Psych:Examination reveals a pleasant individual in no acute distress. The patient is oriented to time, place and person. The patient's mood and affect are appropriate. Patient appears well nourished. Body habitus is overweight     Lymph:  no lymphedema in bilateral lower extremities     Skin intact in bilateral lower extremities with no ulcerations, lesions, rash, erythema. Vascular: There are no varicosities in bilateral lower extremities, sensation no to light touch over bilateral lower extremities.       Left leg/knee exam:  Leg alignment:     neutral  Quadriceps/hamstring atrophy:   no  Knee effusion:    no   Knee erythema:   no  ROM:     Full, 0-120 degrees  Varus laxity at 0 and 30 deg's: no  Valguslaxity at 0 and 30 deg's: no  Recurvatum:    no  Tenderness at:   Lateral joint line    Bilateral Knee strength is 5/5 flexion and extension  There is + L2-S1 motor and sensory function in bilateral lower extremities    Outside record review: office notes and x-rays           Impression:  Arthritis left knee    Plan: Patient Instructions   Continue to weight bear as tolerated  Continue range of motion  Ice and elevate as needed  Tylenol or Motrin for pain  Injection given today in the office   Rest for 24-48 hours  Follow up 6 weeks for right knee injection  We will also evaluate her for her right hand. Left knee injection procedure note    Pre-procedure diagnosis:  Left knee DJD    Post-procedure diagnosis:  same    Procedure: The planned procedure/risks/benefits/alternatives were discussed with the patient. Risks include, but are not limited to, increased pain, drug reaction, infection, bleeding, lack of improvement, neurovascular injury, and increased blood sugar levels. The patient understood and all of their questions were answered. The Left knee was prepped with alcohol then a 22 gauge needle was advanced into the inferior-lateral joint without difficulty. The joint was then injected with 1 ml 1% lidocaine, 1ml of Kenalog (40 mg/ml), 1ml 0.5% bupivacaine the injection site was cleansed with isopropyl alcohol and a band-aid was placed. Complications:  None, the patient tolerated the procedure well. Instructions: The patient was advised to rest the knee and decrease activity for the next 24 to 48 hours. May use prescription or OTC pain relievers as needed for any post-injection pain as well as ice.

## 2021-06-09 NOTE — PROGRESS NOTES
Patient returns with c/o bilateral knee pain L>R. She received bilateral cortisone injection last December with good results. Her pain was controlled for 4 months. She does report her diabetes was high for 3 days after injections. She would like to consider repeat injection today.

## 2021-06-09 NOTE — PATIENT INSTRUCTIONS
Continue to weight bear as tolerated  Continue range of motion  Ice and elevate as needed  Tylenol or Motrin for pain  Injection given today in the office   Rest for 24-48 hours  Follow up 6 weeks for right knee injection  We will also evaluate her for her right hand.

## 2021-09-23 ENCOUNTER — OFFICE VISIT (OUTPATIENT)
Dept: ORTHOPEDIC SURGERY | Age: 56
End: 2021-09-23
Payer: COMMERCIAL

## 2021-09-23 VITALS
OXYGEN SATURATION: 96 % | HEART RATE: 95 BPM | HEIGHT: 62 IN | BODY MASS INDEX: 34.78 KG/M2 | RESPIRATION RATE: 16 BRPM | WEIGHT: 189 LBS

## 2021-09-23 DIAGNOSIS — M79.641 RIGHT HAND PAIN: Primary | ICD-10-CM

## 2021-09-23 DIAGNOSIS — M17.12 ARTHRITIS OF KNEE, LEFT: ICD-10-CM

## 2021-09-23 DIAGNOSIS — M17.11 ARTHRITIS OF KNEE, RIGHT: ICD-10-CM

## 2021-09-23 PROCEDURE — 1036F TOBACCO NON-USER: CPT | Performed by: PHYSICIAN ASSISTANT

## 2021-09-23 PROCEDURE — 20610 DRAIN/INJ JOINT/BURSA W/O US: CPT | Performed by: PHYSICIAN ASSISTANT

## 2021-09-23 PROCEDURE — 99213 OFFICE O/P EST LOW 20 MIN: CPT | Performed by: PHYSICIAN ASSISTANT

## 2021-09-23 PROCEDURE — 3017F COLORECTAL CA SCREEN DOC REV: CPT | Performed by: PHYSICIAN ASSISTANT

## 2021-09-23 PROCEDURE — G8417 CALC BMI ABV UP PARAM F/U: HCPCS | Performed by: PHYSICIAN ASSISTANT

## 2021-09-23 PROCEDURE — G8427 DOCREV CUR MEDS BY ELIG CLIN: HCPCS | Performed by: PHYSICIAN ASSISTANT

## 2021-09-23 ASSESSMENT — ENCOUNTER SYMPTOMS
GASTROINTESTINAL NEGATIVE: 1
RESPIRATORY NEGATIVE: 1
EYES NEGATIVE: 1

## 2021-09-23 NOTE — PROGRESS NOTES
Patient is in the office for right dorsal hand pain, bilateral knee cortisone injections. Patient last steroid injection in the left knee was 06/09/2021 and the right knee was 12/30/2020. Patient states that the injection last about 3-4 months. Patient states that the dorsal side of the hand is having pins and needles and has been going on for several months and about 2-3 weeks ago is when the sensation started to worsen. 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

## 2021-09-23 NOTE — PATIENT INSTRUCTIONS
Continue to weight bear as tolerated  Continue range of motion  Ice and elevate as needed  Tylenol or Motrin for pain  Injection given today in the office in bilateral knee  Rest for 24-48 hours  Follow up as needed

## 2021-09-23 NOTE — PROGRESS NOTES
Review of Systems   Constitutional: Negative. HENT: Negative. Eyes: Negative. Respiratory: Negative. Cardiovascular: Negative. Gastrointestinal: Negative. Genitourinary: Negative. Musculoskeletal: Positive for arthralgias and myalgias. Skin: Negative. Neurological: Negative. Psychiatric/Behavioral: Negative. HPI:  Kana Aguilera is a 55 y.o. male the presents the office today complaining of bilateral knee pain as well as right hand burning over the dorsal aspect of her hand. She states has been going on for several months. She does not feel there is any radiation of the pain up the arm or down into the fingers. She denies any numbness or tingling but just states there is a burning type sensation of the dorsal part of the hand. She does not recall any particular injury to the hand. Past Medical History:   Diagnosis Date    Diabetes mellitus ([facility] [address]     History of nuclear stress test 06/23/2016    cardiolite-normal,EF65%    Hx of echocardiogram 06/23/2016    EF >55%. Normal chamber sizes. LVH with normal LVSF. Mild MR/TR. Normal sized abdominal aorta at 2.1cm.  Hyperlipidemia     Hypertension     Obesity (BMI 30-39. 9)     Shoulder pain, bilateral     SOB (shortness of breath)        History reviewed. No pertinent surgical history. History reviewed. No pertinent family history.     Social History     Socioeconomic History    Marital status:      Spouse name: None    Number of children: None    Years of education: None    Highest education level: None   Occupational History    None   Tobacco Use    Smoking status: Never Smoker    Smokeless tobacco: Never Used   Vaping Use    Vaping Use: Never used   Substance and Sexual Activity    Alcohol use: No     Alcohol/week: 0.0 standard drinks    Drug use: No    Sexual activity: None   Other Topics Concern    None   Social History Narrative    None     Social Determinants of Health     Financial Resource Strain:     Difficulty of Paying Living Expenses:    Food Insecurity:     Worried About Running Out of Food in the Last Year:     [address] in the Last Year:    Transportation Needs:     Lack of Transportation (Medical):  Lack of Transportation (Non-Medical):    Physical Activity:     Days of Exercise per Week:     Minutes of Exercise per Session:    Stress:     Feeling of Stress :    Social Connections:     Frequency of Communication with Friends and Family:     Frequency of Social Gatherings with Friends and Family:     Attends [employer] Services:     Active Member of Clubs or Organizations:     Attends Club or Organization Meetings:     Marital Status:    Intimate Partner Violence:     Fear of Current or Ex-Partner:     Emotionally Abused:     Physically Abused:     Sexually Abused:        Current Outpatient Medications   Medication Sig Dispense Refill    lidocaine (LIDODERM) 5 % Place 1 patch onto the skin daily 12 hours on, 12 hours off. 3 patch 0    methocarbamol (ROBAXIN) 500 MG tablet Take 1 tablet by mouth 3 times daily As needed for muscle spasm. 9 tablet 0    naproxen (NAPROSYN) 500 MG tablet Take 1 tablet by mouth 2 times daily as needed for Pain 20 tablet 0    permethrin (ELIMITE) 5 % cream Apply topically as directed 1 Tube 0    hydrocortisone acetate 1 % CREA Apply 1 Tube topically three times daily Until rash resolves, no longer than 3 weeks. 1 Tube 0    insulin NPH (HUMULIN N) 100 UNIT/ML injection vial Inject 50 Units into the skin 2 times daily (before meals)      insulin regular (HUMULIN R) 100 UNIT/ML injection Inject 70 Units into the skin See Admin Instructions With lunch      losartan (COZAAR) 50 MG tablet Take 50 mg by mouth 2 times daily      simvastatin (ZOCOR) 20 MG tablet Take 20 mg by mouth nightly      glucose blood VI test strips (ASCENSIA AUTODISC VI;ONE TOUCH ULTRA TEST VI) strip 1 each by In Vitro route daily As needed.        No current facility-administered medications for this visit. Allergies   Allergen Reactions    Nuts [Peanut-Containing Drug Products] Anaphylaxis    Shellfish-Derived Products Anaphylaxis    Lisinopril Other (See Comments)     lathargic    Metformin And Related Diarrhea       Review of Systems:  See above      Physical Exam:   Pulse 95   Resp 16   Ht 5' 2\" (1.575 m)   Wt 189 lb (85.7 kg)   LMP 07/09/2016   SpO2 96%   BMI 34.57 kg/m²        Gait is Normal. The patient can bear weight on the injured extremity. Gen/Psych:Examination reveals a pleasant individual in no acute distress. The patient is oriented to time, place and person. The patient's mood and affect are appropriate. Patient appears well nourished. Body habitus is overweight     Lymph:  no lymphedema in bilateral lower extremities     Skin intact in bilateral lower extremities with no ulcerations, lesions, rash, erythema. Vascular: There are no varicosities in bilateral lower extremities, sensation intact to light touch over bilateral lower extremities. bilateral leg/knee exam:  Leg alignment:     neutral  Quadriceps/hamstring atrophy:   no  Knee effusion:    no   Knee erythema:   no  ROM:     Full, 0-120 degrees  Varus laxity at 0 and 30 deg's: no  Valguslaxity at 0 and 30 deg's: no  Recurvatum:    no  Tenderness at:   Patella and medial    Bilateral Knee strength is 5/5 flexion and extension  There is + L2-S1 motor and sensory function in bilateral lower extremities    Right wrist:  Inspection: No erythema, no edema, no ecchymosis  Strength: 5/5 resisted flexion, 5/5 resisted extension  Range of motion: 50 degrees extension, 50 degrees flexion. Outside record review: office notes and x-rays reviewed    3 views of the right wrist taken and reviewed in the office today show no acute fractures, no dislocations, mild degenerative change within the right wrist.      Impression:     Diagnosis Orders   1.  Arthritis of knee, left  [facility] ARTHROCENTESIS ASPIR&/INJ MAJOR JT/BURSA W/O US   2. Arthritis of knee, right  [facility] ARTHROCENTESIS ASPIR&/INJ MAJOR JT/BURSA W/O US           Plan:    Patient Instructions   Continue to weight bear as tolerated  Continue range of motion  Ice and elevate as needed  Tylenol or Motrin for pain  Injection given today in the office in bilateral knee  Rest for 24-48 hours  Follow up as needed    Right knee injection procedure note    Pre-procedure diagnosis:  Right knee DJD    Post-procedure diagnosis:  same    Procedure: The planned procedure/risks/benefits/alternatives were discussed with the patient. Risks include, but are not limited to, increased pain, drug reaction, infection, bleeding, lack of improvement, neurovascular injury, and increased blood sugar levels. The patient understood and all of their questions were answered. The right knee was prepped with alcohol, then a 22 gauge needle was advanced into the inferior-lateral joint without difficulty. The joint was then injected with 1 ml 1% lidocaine, 1ml of Kenalog (40 mg/ml), 1ml of 0.5% bupivacaine. The injection site was cleansed with isopropyl alcohol and a band-aid was placed. Complications:  None, the patient tolerated the procedure well. Left knee injection procedure note    Pre-procedure diagnosis:  Left knee DJD    Post-procedure diagnosis:  same    Procedure: The planned procedure/risks/benefits/alternatives were discussed with the patient. Risks include, but are not limited to, increased pain, drug reaction, infection, bleeding, lack of improvement, neurovascular injury, and increased blood sugar levels. The patient understood and all of their questions were answered. The Left knee was prepped with alcohol then a 22 gauge needle was advanced into the inferior-lateral joint without difficulty.   The joint was then injected with 1 ml 1% lidocaine, 1ml of Kenalog (40 mg/ml), 1ml 0.5% bupivacaine the injection site was cleansed with isopropyl alcohol and a band-aid was placed. Complications:  None, the patient tolerated the procedure well. Instructions: The patient was advised to rest the knee and decrease activity for the next 24 to 48 hours. May use prescription or OTC pain relievers as needed for any post-injection pain as well as ice.

## 2021-12-21 ENCOUNTER — HOSPITAL ENCOUNTER (EMERGENCY)
Age: 56
Discharge: HOME | End: 2021-12-21
Attending: STUDENT IN AN ORGANIZED HEALTH CARE EDUCATION/TRAINING PROGRAM
Payer: COMMERCIAL

## 2021-12-21 ENCOUNTER — APPOINTMENT (OUTPATIENT)
Dept: GENERAL RADIOLOGY | Age: 56
End: 2021-12-21
Payer: COMMERCIAL

## 2021-12-21 VITALS
DIASTOLIC BLOOD PRESSURE: 86 MMHG | HEART RATE: 77 BPM | SYSTOLIC BLOOD PRESSURE: 102 MMHG | RESPIRATION RATE: 18 BRPM | WEIGHT: 200 LBS | TEMPERATURE: 98.7 F | BODY MASS INDEX: 35.44 KG/M2 | HEIGHT: 63 IN | OXYGEN SATURATION: 98 %

## 2021-12-21 DIAGNOSIS — S61.219A FINGER LACERATION, INITIAL ENCOUNTER: Primary | ICD-10-CM

## 2021-12-21 PROCEDURE — 73130 X-RAY EXAM OF HAND: CPT

## 2021-12-21 PROCEDURE — 99283 EMERGENCY DEPT VISIT LOW MDM: CPT

## 2021-12-21 NOTE — ED NOTES
Discharge instructions reviewed with pt. All questions answered at this time. Pt verbalized understanding.       Krystle Shelton RN  12/21/21 5312

## 2021-12-22 NOTE — ED PROVIDER NOTES
Emergency Department Encounter    Patient: Peggy Davalos  MRN: [id number]  : 1965  Date of Evaluation: 2021  ED Provider:  Vonnie Friedman MD    Triage Chief Complaint:   Finger Pain (left ring finger injury last week (thursday))    [place]:  Peggy Davalos is a 55 y.o. female presenting with a left index finger laceration that occurred last Thursday. Patient states she was trimming a tree and cut her left index finger. States she initially went to urgent care who updated her tetanus. States they did not suture the laceration. Come in today for a wound check. States she has had some mild bleeding from the laceration. Denies any motor or sensory changes. Denies antiplatelets or anticoagulation. Denies any erythema around the site, fluctuance, purulence, fevers or chills or signs of infection. Denies any other symptoms including headache, blurred vision, focal deficits, chest pain or shortness of breath, cough or sputum production abdominal pain, change in urination, change in bowel habits. ROS - see HPI, below listed is current ROS at time of my eval:  At least 14 systems reviewed, negative other HPI. Past Medical History:   Diagnosis Date    Diabetes mellitus ([facility] [address]     History of nuclear stress test 2016    cardiolite-normal,EF65%    Hx of echocardiogram 2016    EF >55%. Normal chamber sizes. LVH with normal LVSF. Mild MR/TR. Normal sized abdominal aorta at 2.1cm.  Hyperlipidemia     Hypertension     Obesity (BMI 30-39. 9)     Shoulder pain, bilateral     SOB (shortness of breath)      History reviewed. No pertinent surgical history. History reviewed. No pertinent family history.   Social History     Socioeconomic History    Marital status:      Spouse name: Not on file    Number of children: Not on file    Years of education: Not on file    Highest education level: Not on file   Occupational History    Not on file   Tobacco Use    Smoking status: Never Smoker    Smokeless tobacco: Never Used   Vaping Use    Vaping Use: Never used   Substance and Sexual Activity    Alcohol use: No     Alcohol/week: 0.0 standard drinks    Drug use: No    Sexual activity: Not on file   Other Topics Concern    Not on file   Social History Narrative    Not on file     Social Determinants of Health     Financial Resource Strain:     Difficulty of Paying Living Expenses: Not on file   Food Insecurity:     Worried About Running Out of Food in the Last Year: Not on file    Jv of Food in the Last Year: Not on file   Transportation Needs:     Lack of Transportation (Medical): Not on file    Lack of Transportation (Non-Medical): Not on file   Physical Activity:     Days of Exercise per Week: Not on file    Minutes of Exercise per Session: Not on file   Stress:     Feeling of Stress : Not on file   Social Connections:     Frequency of Communication with Friends and Family: Not on file    Frequency of Social Gatherings with Friends and Family: Not on file    Attends Latter-day Services: Not on file    Active Member of [address] [place] [address] or Organizations: Not on file    Attends Club or Organization Meetings: Not on file    Marital Status: Not on file   Intimate Partner Violence:     Fear of Current or Ex-Partner: Not on file    Emotionally Abused: Not on file    Physically Abused: Not on file    Sexually Abused: Not on file   Housing Stability:     Unable to Pay for Housing in the Last Year: Not on file    Number of [place] in the Last Year: Not on file    Unstable Housing in the Last Year: Not on file     No current facility-administered medications for this encounter. Current Outpatient Medications   Medication Sig Dispense Refill    lidocaine (LIDODERM) 5 % Place 1 patch onto the skin daily 12 hours on, 12 hours off. 3 patch 0    methocarbamol (ROBAXIN) 500 MG tablet Take 1 tablet by mouth 3 times daily As needed for muscle spasm.  9 tablet 0    naproxen (NAPROSYN) 500 MG tablet Take 1 tablet by mouth 2 times daily as needed for Pain 20 tablet 0    permethrin (ELIMITE) 5 % cream Apply topically as directed 1 Tube 0    hydrocortisone acetate 1 % CREA Apply 1 Tube topically three times daily Until rash resolves, no longer than 3 weeks. 1 Tube 0    insulin NPH (HUMULIN N) 100 UNIT/ML injection vial Inject 50 Units into the skin 2 times daily (before meals)      insulin regular (HUMULIN R) 100 UNIT/ML injection Inject 70 Units into the skin See Admin Instructions With lunch      losartan (COZAAR) 50 MG tablet Take 50 mg by mouth 2 times daily      simvastatin (ZOCOR) 20 MG tablet Take 20 mg by mouth nightly      glucose blood VI test strips (ASCENSIA AUTODISC VI;ONE TOUCH ULTRA TEST VI) strip 1 each by In Vitro route daily As needed. Allergies   Allergen Reactions    Nuts [Peanut-Containing Drug Products] Anaphylaxis    Shellfish-Derived Products Anaphylaxis    Lisinopril Other (See Comments)     lathargic    Metformin And Related Diarrhea       Nursing Notes Reviewed    Physical Exam:  Triage VS:    ED Triage Vitals [12/21/21 1038]   Enc Vitals Group      /86      Pulse 77      Resp 18      Temp 98.7 °F (37.1 °C)      Temp src       SpO2 98 %      Weight 200 lb (90.7 kg)      Height 5' 2.5\" (1.588 m)      Head Circumference       Peak Flow       Pain Score       Pain Loc       Pain Edu? Excl. in [address]? My pulse ox interpretation is - normal    General appearance:  No acute distress. Skin:  Warm. Dry. Eye:  Extraocular movements intact. Ears, nose, mouth and throat:  Oral mucosa moist   Neck:  Trachea midline.    Extremity: 1.5 cm laceration the distal aspect of the left index finger, laceration is relatively superficial, no active bleeding, normal sensation light touch distally with less than 2-second cap refill, 2+ distal pulses, normal FDS and FDP function with normal extensor function as well, normal range of motion of all joints normal, normal sensation light touch, no other areas of injury, no erythema, fluctuance, purulence or signs of infection  Heart:  Regular rate and rhythm, normal S1 & S2, no extra heart sounds. Perfusion:  intact  Respiratory:  Lungs clear to auscultation bilaterally. Respirations nonlabored. Abdominal:  Normal bowel sounds. Soft. Nontender. Non distended. Back:  No CVA tenderness to palpation     Neurological:  Alert and oriented times 3. No focal neuro deficits. Psychiatric:  Appropriate    I have reviewed and interpreted all of the currently available lab results from this visit (if applicable):  No results found for this visit on 12/21/21. Radiographs (if obtained):  Radiologist's Report Reviewed:  XR HAND LEFT (MIN 3 VIEWS)    Result Date: 12/21/2021  EXAMINATION: THREE XRAY VIEWS OF THE LEFT HAND 12/21/2021 11:33 am COMPARISON: None. HISTORY: ORDERING SYSTEM PROVIDED HISTORY: x ray TECHNOLOGIST PROVIDED HISTORY: Reason for exam:->x ray Reason for Exam: laceration distal index finger FINDINGS: There is soft tissue irregularity of the tip of the left index finger, which may reflect underlying soft tissue injury. There is mild soft tissue swelling of the left index finger. There is punctate well corticated ossicle seen along the ulnar styloid, which may reflect sequela of an old trauma without convincing radiographic evidence of acute fracture or dislocation seen. , particularly the left index finger appears intact. The 1st proximal phalanx appears to be short which may be an anatomic variant     Soft tissue irregularity of the tip of the left index finger, which may reflect soft tissue injury without convincing radiographic evidence of acute fracture or dislocation of the left hand seen. RECOMMENDATION: If there is a clinical concern for occult fracture, consider follow-up examination in 7-10 days.        MDM:    60-year-old female presenting with laceration to the left index finger. History and be seen above. It occurred about 5 to 6 days prior to presentation. Vitals on presentation are reassuring and patient afebrile satting on room air. Physical exam there are no signs of infection. No signs of tendon injury patient has normal range of motion of all joints, there is no active bleeding. X-ray was obtained showing soft tissue irregularity of the tip of the left index finger which may reflect soft tissue injury without radiographic evidence of acute fracture dislocation of the left hand seen. On evaluation of the wound there is no foreign body seen, no tendon injury, wound was washed. Since this occurred 5 to 6 days prior to presentation will not suture at this time. Wound was dressed in the ED. Discussed return precautions as well as follow-up with primary care physician for wound check patient agreeable plan to discharge home    Clinical Impression:  1. Finger laceration, initial encounter      Disposition referral (if applicable): Charmayne Pick  [address] [place], [place] [id number]  [facility]  [phone]    In 2 days      [facility] Emergency Department  [place] [id number]  [phone]    If symptoms worsen    Disposition medications (if applicable):  Discharge Medication List as of 12/21/2021 12:25 PM        ED Provider Disposition Time  DISPOSITION Decision To Discharge 12/21/2021 12:29:29 PM      Comment: Please note this report has been produced using speech recognition software and may contain errors related to that system including errors in grammar, punctuation, and spelling, as well as words and phrases that may be inappropriate. Efforts were made to edit the dictations.         Agnieszka Dia MD  12/22/21 [id number]

## 2022-03-16 ENCOUNTER — OFFICE VISIT (OUTPATIENT)
Dept: ORTHOPEDIC SURGERY | Age: 57
End: 2022-03-16
Payer: COMMERCIAL

## 2022-03-16 VITALS
OXYGEN SATURATION: 99 % | HEART RATE: 76 BPM | WEIGHT: 200 LBS | HEIGHT: 63 IN | RESPIRATION RATE: 16 BRPM | BODY MASS INDEX: 35.44 KG/M2

## 2022-03-16 DIAGNOSIS — M17.11 ARTHRITIS OF KNEE, RIGHT: Primary | ICD-10-CM

## 2022-03-16 DIAGNOSIS — M17.12 ARTHRITIS OF KNEE, LEFT: ICD-10-CM

## 2022-03-16 PROCEDURE — 20610 DRAIN/INJ JOINT/BURSA W/O US: CPT | Performed by: PHYSICIAN ASSISTANT

## 2022-03-16 NOTE — LETTER
[address] [facility]  [address] [address]  Phone: [phone]  Fax: [phone]    Natalia Mclaughlin        March 16, 2022     Patient: Nikhil Burns   YOB: 1965   Date of Visit: 3/16/2022       To Whom it May Concern:    Martha Burns was seen in my clinic on 3/16/2022. She may return to work on 03/17/2022. If you have any questions or concerns, please don't hesitate to call.     Sincerely,         Nicholas Smith PA-C

## 2022-03-16 NOTE — PATIENT INSTRUCTIONS
Work Letter given  Continue to [employer] bear as tolerated  Continue range of motion  Ice and elevate as needed  Tylenol or Motrin for pain  Injection given today in the office   Rest for 24-48 hours  Follow up as needed

## 2022-03-16 NOTE — PROGRESS NOTES
Review of Systems   Constitutional: Negative. HENT: Negative. Eyes: Negative. Respiratory: Negative. Cardiovascular: Negative. Gastrointestinal: Negative. Genitourinary: Negative. Musculoskeletal: Positive for arthralgias and myalgias. Skin: Negative. Neurological: Negative. Psychiatric/Behavioral: Negative. HPI:  Percy Hamilton is a 65 y.o. female who presents the office today complaining of bilateral knee pain. She did have steroid injections in the past in both knees on September 23, 2021, which helped up until about a month ago. She would like to repeat injections if possible. She rates her knee pain at a 6/10. Past Medical History:   Diagnosis Date    [facility] mellitus ([facility] [address]     History of nuclear stress test 06/23/2016    cardiolite-normal,EF65%    Hx of echocardiogram 06/23/2016    EF >55%. Normal chamber sizes. LVH with normal LVSF. Mild MR/TR. Normal sized abdominal aorta at 2.1cm.  Hyperlipidemia     Hypertension     Obesity (BMI 30-39. 9)     Shoulder pain, bilateral     SOB (shortness of breath)        No past surgical history on file. No family history on file.     Social History     Socioeconomic History    Marital status:      Spouse name: None    Number of children: None    Years of education: None    Highest education level: None   Occupational History    None   Tobacco Use    Smoking status: Never Smoker    Smokeless tobacco: Never Used   Vaping Use    Vaping Use: Never used   Substance and Sexual Activity    Alcohol use: No     Alcohol/week: 0.0 standard drinks    Drug use: No    Sexual activity: None   Other Topics Concern    None   Social History Narrative    None     Social Determinants of Health     Financial Resource Strain:     Difficulty of Paying Living Expenses: Not on file   Food Insecurity:     Worried About Running Out of Food in the Last Year: Not on file    Jv of Food in the Last Year: Not on file   Transportation Needs:     Lack of Transportation (Medical): Not on file    Lack of Transportation (Non-Medical): Not on file   Physical Activity:     Days of Exercise per Week: Not on file    Minutes of Exercise per Session: Not on file   Stress:     Feeling of Stress : Not on file   Social Connections:     Frequency of Communication with Friends and Family: Not on file    Frequency of Social Gatherings with Friends and Family: Not on file    Attends Pentecostal Services: Not on file    Active Member of [address] [place] [address] [employer] or Organizations: Not on file    Attends Club or Organization Meetings: Not on file    Marital Status: Not on file   Intimate Partner Violence:     Fear of Current or Ex-Partner: Not on file    Emotionally Abused: Not on file    Physically Abused: Not on file    Sexually Abused: Not on file   Housing Stability:     Unable to Pay for Housing in the Last Year: Not on file    Number of [place] in the Last Year: Not on file    Unstable Housing in the Last Year: Not on file       Current Outpatient Medications   Medication Sig Dispense Refill    lidocaine (LIDODERM) 5 % Place 1 patch onto the skin daily 12 hours on, 12 hours off. 3 patch 0    methocarbamol (ROBAXIN) 500 MG tablet Take 1 tablet by mouth 3 times daily As needed for muscle spasm. 9 tablet 0    naproxen (NAPROSYN) 500 MG tablet Take 1 tablet by mouth 2 times daily as needed for Pain 20 tablet 0    permethrin (ELIMITE) 5 % cream Apply topically as directed 1 Tube 0    hydrocortisone acetate 1 % CREA Apply 1 Tube topically three times daily Until rash resolves, no longer than 3 weeks.  1 Tube 0    insulin NPH (HUMULIN N) 100 UNIT/ML injection vial Inject 50 Units into the skin 2 times daily (before meals)      insulin regular (HUMULIN R) 100 UNIT/ML injection Inject 70 Units into the skin See Admin Instructions With lunch      losartan (COZAAR) 50 MG tablet Take 50 mg by mouth 2 times daily      simvastatin (ZOCOR) 20 MG tablet Take 20 mg by mouth nightly      glucose blood VI test strips (ASCENSIA AUTODISC VI;ONE TOUCH ULTRA TEST VI) strip 1 each by In Vitro route daily As needed. No current facility-administered medications for this visit. Allergies   Allergen Reactions    Nuts [Peanut-Containing Drug Products] Anaphylaxis    Shellfish-Derived Products Anaphylaxis    Lisinopril Other (See Comments)     lathargic    Metformin And Related Diarrhea       Review of Systems:  See above      Physical Exam:   Pulse 76   Resp 16   Ht 5' 2.5\" (1.588 m)   Wt 200 lb (90.7 kg)   LMP 07/09/2016   SpO2 99%   BMI 36.00 kg/m²        Gait is Antalgic. The patient can bear weight on the injured extremity. Gen/Psych:Examination reveals a pleasant individual in no acute distress. The patient is oriented to time, place and person. The patient's mood and affect are appropriate. Patient appears well nourished. Body habitus is overweight     Lymph:  no lymphedema in bilateral lower extremities     Skin intact in bilateral lower extremities with no ulcerations, lesions, rash, erythema. Vascular: There are mild varicosities in bilateral lower extremities, sensation intac to light touch over bilateral lower extremities. bilateral leg/knee exam:  Leg alignment:     neutral  Quadriceps/hamstring atrophy:   no  Knee effusion:    no   Knee erythema:   no  ROM:     Full, 0-120 degrees  Varus laxity at 0 and 30 deg's: no  Valguslaxity at 0 and 30 deg's: no  Recurvatum:    no  Tenderness at:   Medial and lateral joint line tenderness    Bilateral Knee strength is 5/5 flexion and extension  There is + L2-S1 motor and sensory function in bilateral lower extremities    Outside record review: office notes and prior x-rays reviewed  Imaging studies:  X-rays of the left knee and the right knee were reviewed which showed moderate degenerative changes in both knees. Impression:     Diagnosis Orders   1.  Arthritis of knee, right     2. Arthritis of knee, left         Plan:   Patient Instructions   Work Letter given  Continue to weight bear as tolerated  Continue range of motion  Ice and elevate as needed  Tylenol or Motrin for pain  Injection given today in the office   Rest for 24-48 hours  Follow up as needed      Left knee injection procedure note    Pre-procedure diagnosis:  Left knee DJD    Post-procedure diagnosis:  same    Procedure: The planned procedure/risks/benefits/alternatives were discussed with the patient. Risks include, but are not limited to, increased pain, drug reaction, infection, bleeding, lack of improvement, neurovascular injury, and increased blood sugar levels. The patient understood and all of their questions were answered. The Left knee was prepped with alcohol then a 22 gauge needle was advanced into the inferior-lateral joint without difficulty. The joint was then injected with 1 ml 1% lidocaine, 1ml of Kenalog (40 mg/ml), 1ml 0.5% bupivacaine the injection site was cleansed with isopropyl alcohol and a band-aid was placed. Complications:  None, the patient tolerated the procedure well. Right knee injection procedure note    Pre-procedure diagnosis:  Right knee DJD    Post-procedure diagnosis:  same    Procedure: The planned procedure/risks/benefits/alternatives were discussed with the patient. Risks include, but are not limited to, increased pain, drug reaction, infection, bleeding, lack of improvement, neurovascular injury, and increased blood sugar levels. The patient understood and all of their questions were answered. The right knee was prepped with alcohol, then a 22 gauge needle was advanced into the inferior-lateral joint without difficulty. The joint was then injected with 1 ml 1% lidocaine, 1ml of Kenalog (40 mg/ml), 1ml of 0.5% bupivacaine. The injection site was cleansed with isopropyl alcohol and a band-aid was placed.       Complications:  None, the patient tolerated the procedure well. Instructions: The patient was advised to rest the knee and decrease activity for the next 24 to 48 hours. May use prescription or OTC pain relievers as needed for any post-injection pain as well as ice.

## 2022-04-13 ASSESSMENT — ENCOUNTER SYMPTOMS
GASTROINTESTINAL NEGATIVE: 1
RESPIRATORY NEGATIVE: 1
EYES NEGATIVE: 1

## 2022-07-27 ENCOUNTER — OFFICE VISIT (OUTPATIENT)
Dept: ORTHOPEDIC SURGERY | Age: 57
End: 2022-07-27
Payer: COMMERCIAL

## 2022-07-27 VITALS
DIASTOLIC BLOOD PRESSURE: 78 MMHG | HEIGHT: 63 IN | HEART RATE: 93 BPM | BODY MASS INDEX: 33.49 KG/M2 | RESPIRATION RATE: 16 BRPM | SYSTOLIC BLOOD PRESSURE: 120 MMHG | WEIGHT: 189 LBS | OXYGEN SATURATION: 99 %

## 2022-07-27 DIAGNOSIS — M17.12 PRIMARY OSTEOARTHRITIS OF LEFT KNEE: ICD-10-CM

## 2022-07-27 DIAGNOSIS — M21.6X2 ACQUIRED PLANOVALGUS DEFORMITY OF LEFT FOOT: ICD-10-CM

## 2022-07-27 DIAGNOSIS — M17.11 PRIMARY OSTEOARTHRITIS OF RIGHT KNEE: Primary | ICD-10-CM

## 2022-07-27 PROCEDURE — 20610 DRAIN/INJ JOINT/BURSA W/O US: CPT | Performed by: PHYSICIAN ASSISTANT

## 2022-07-27 ASSESSMENT — ENCOUNTER SYMPTOMS
GASTROINTESTINAL NEGATIVE: 1
RESPIRATORY NEGATIVE: 1
EYES NEGATIVE: 1

## 2022-07-27 NOTE — PROGRESS NOTES
Review of Systems   Constitutional: Negative. HENT: Negative. Eyes: Negative. Respiratory: Negative. Cardiovascular: Negative. Gastrointestinal: Negative. Genitourinary: Negative. Musculoskeletal:  Positive for arthralgias and myalgias. Skin: Negative. Neurological: Negative. Psychiatric/Behavioral: Negative. HPI:  Nelia Cote is a 65 y.o. female who presents the office today complaining of bilateral knee pain. She has known arthritis in both knees. She has had multiple steroid injections in the past which help but eventually they were off and her knee started to hurt again. She is also complaining a little bit about her left ankle swelling with no injury that just started today. She states the left knee is bothering her more than the right knee today and she rates her pain at a 7/10 in the left knee. Past Medical History:   Diagnosis Date    Diabetes mellitus ([facility] [address]     History of nuclear stress test 06/23/2016    cardiolite-normal,EF65%    Hx of echocardiogram 06/23/2016    EF >55%. Normal chamber sizes. LVH with normal LVSF. Mild MR/TR. Normal sized abdominal aorta at 2.1cm. Hyperlipidemia     Hypertension     Obesity (BMI 30-39. 9)     Shoulder pain, bilateral     SOB (shortness of breath)        No past surgical history on file. No family history on file. Social History     Socioeconomic History    Marital status:      Spouse name: None    Number of children: None    Years of education: None    Highest education level: None   Tobacco Use    Smoking status: Never    Smokeless tobacco: Never   Vaping Use    Vaping Use: Never used   Substance and Sexual Activity    Alcohol use: No     Alcohol/week: 0.0 standard drinks    Drug use: No       Current Outpatient Medications   Medication Sig Dispense Refill    lidocaine (LIDODERM) 5 % Place 1 patch onto the skin daily 12 hours on, 12 hours off.  3 patch 0    methocarbamol (ROBAXIN) 500 MG tablet Take 1 tablet by mouth 3 times daily As needed for muscle spasm. 9 tablet 0    naproxen (NAPROSYN) 500 MG tablet Take 1 tablet by mouth 2 times daily as needed for Pain 20 tablet 0    permethrin (ELIMITE) 5 % cream Apply topically as directed 1 Tube 0    hydrocortisone acetate 1 % CREA Apply 1 Tube topically three times daily Until rash resolves, no longer than 3 weeks. 1 Tube 0    insulin NPH (HUMULIN N;NOVOLIN N) 100 UNIT/ML injection vial Inject 50 Units into the skin 2 times daily (before meals)      insulin regular (HUMULIN R;NOVOLIN R) 100 UNIT/ML injection Inject 70 Units into the skin See Admin Instructions With lunch      losartan (COZAAR) 50 MG tablet Take 50 mg by mouth 2 times daily      simvastatin (ZOCOR) 20 MG tablet Take 20 mg by mouth nightly      glucose blood VI test strips (ASCENSIA AUTODISC VI;ONE TOUCH ULTRA TEST VI) strip 1 each by In Vitro route daily As needed. No current facility-administered medications for this visit. Allergies   Allergen Reactions    Nuts [Peanut-Containing Drug Products] Anaphylaxis    Shellfish-Derived Products Anaphylaxis    Lisinopril Other (See Comments)     lathargic    Metformin And Related Diarrhea       Review of Systems:  See above      Physical Exam:   /78 (Site: Left Upper Arm, Position: Sitting, Cuff Size: Medium Adult)   Pulse 93   Resp 16   Ht 5' 2.5\" (1.588 m)   Wt 189 lb (85.7 kg)   LMP 07/09/2016   SpO2 99%   BMI 34.02 kg/m²        Gait is Antalgic. The patient can bear weight on the injured extremity. Gen/Psych:Examination reveals a pleasant individual in no acute distress. The patient is oriented to time, place and person. The patient's mood and affect are appropriate. Patient appears well nourished. Body habitus is overweight     Lymph:  no lymphedema in bilateral lower extremities     Skin intact in bilateral lower extremities with no ulcerations, lesions, rash, erythema. Vascular:  There are mild varicosities in diagnosis:  same    Procedure: The planned procedure/risks/benefits/alternatives were discussed with the patient. Risks include, but are not limited to, increased pain, drug reaction, infection, bleeding, lack of improvement, neurovascular injury, and increased blood sugar levels. The patient understood and all of their questions were answered. The Left knee was prepped with alcohol then a 25 gauge needle was advanced into the inferior-lateral joint without difficulty. The joint was then injected with 1 ml 1% lidocaine, 1ml of Kenalog (40 mg/ml), 1ml 0.5% bupivacaine the injection site was cleansed with isopropyl alcohol and a band-aid was placed. Complications:  None, the patient tolerated the procedure well. Right knee injection procedure note    Pre-procedure diagnosis:  Right knee DJD    Post-procedure diagnosis:  same    Procedure: The planned procedure/risks/benefits/alternatives were discussed with the patient. Risks include, but are not limited to, increased pain, drug reaction, infection, bleeding, lack of improvement, neurovascular injury, and increased blood sugar levels. The patient understood and all of their questions were answered. The right knee was prepped with alcohol, then a 25 gauge needle was advanced into the inferior-lateral joint without difficulty. The joint was then injected with 1 ml 1% lidocaine, 1ml of Kenalog (40 mg/ml), 1ml of 0.5% bupivacaine. The injection site was cleansed with isopropyl alcohol and a band-aid was placed. Complications:  None, the patient tolerated the procedure well. Instructions: The patient was advised to rest the knee and decrease activity for the next 24 to 48 hours. May use prescription or OTC pain relievers as needed for any post-injection pain as well as ice.

## 2022-07-29 ENCOUNTER — TELEPHONE (OUTPATIENT)
Dept: BARIATRICS/WEIGHT MGMT | Age: 57
End: 2022-07-29

## 2022-08-03 ENCOUNTER — OFFICE VISIT (OUTPATIENT)
Dept: BARIATRICS/WEIGHT MGMT | Age: 57
End: 2022-08-03

## 2022-08-03 VITALS — BODY MASS INDEX: 33.84 KG/M2 | HEIGHT: 63 IN | WEIGHT: 191 LBS

## 2022-08-03 DIAGNOSIS — E66.9 OBESITY (BMI 30.0-34.9): Primary | ICD-10-CM

## 2022-08-03 PROCEDURE — 99999 PR OFFICE/OUTPT VISIT,PROCEDURE ONLY: CPT

## 2022-08-03 NOTE — PROGRESS NOTES
[facility] - Non-Surgical Weight Loss Program    REASON FOR VISIT: Initial Non-Surgical Program    Chief Complaint:    Chief Complaint   Patient presents with    Weight Management       SUBJECTIVE:  Pt here for initial nutrition consult in non-surgical weight management program. Her challenges include DM since having gestational diabetes and inability to manage her weight and blood sugars. The patient is a 65 y.o. female being seen for obesity, enrolled in Non-Surgical Weight Loss Program; [facility], Height: 5' 2.5\" (158.8 cm), Weight: 191 lb (86.6 kg), Current Body mass index is 34.38 kg/m². patient's PCP is Max Herring    Comorbid Conditions:  Significant diseases affecting this patient are   Past Medical History:   Diagnosis Date    Diabetes mellitus ([facility] [address]     History of nuclear stress test 06/23/2016    cardiolite-normal,EF65%    Hx of echocardiogram 06/23/2016    EF >55%. Normal chamber sizes. LVH with normal LVSF. Mild MR/TR. Normal sized abdominal aorta at 2.1cm. Hyperlipidemia     Hypertension     Obesity (BMI 30-39. 9)     Shoulder pain, bilateral     SOB (shortness of breath)    . Review of Systems - Review of Systems  Otherwise per HPI. Allergies: Allergies   Allergen Reactions    Nuts [Peanut-Containing Drug Products] Anaphylaxis    Shellfish-Derived Products Anaphylaxis    Lisinopril Other (See Comments)     lathargic    Metformin And Related Diarrhea       Past Surgical History:  No past surgical history on file. Family History:  No family history on file.     Social History:  Social History     Socioeconomic History    Marital status:      Spouse name: Not on file    Number of children: Not on file    Years of education: Not on file    Highest education level: Not on file   Occupational History    Not on file   Tobacco Use    Smoking status: Never    Smokeless tobacco: Never   Vaping Use    Vaping Use: Never used   Substance and Sexual Activity Alcohol use: No     Alcohol/week: 0.0 standard drinks    Drug use: No    Sexual activity: Not on file   Other Topics Concern    Not on file   Social History Narrative    Not on file     Social Determinants of Health     Financial Resource Strain: Not on file   Food Insecurity: Not on file   Transportation Needs: Not on file   Physical Activity: Not on file   Stress: Not on file   Social Connections: Not on file   Intimate Partner Violence: Not on file   Housing Stability: Not on file         OBJECTIVE:  Physical Exam   Ht 5' 2.5\" (1.588 m)   Wt 191 lb (86.6 kg)   LMP 07/09/2016   BMI 34.38 kg/m²        NUTRITION DIAGNOSIS: Overweight / Obesity   Problem: Increased adiposity compared to reference standard or established norm   Etiology: Excess intake compared to output over time   S/S: Ht: 5' 2.5\" Wt: 191 lb BMI: 34.38    NUTRITION INTERVENTIONS:    Individualized treatment goals to address nutrition diagnosis:   Instructed on 1200 calorie diet for weight loss   Provided sample menus, healthy foods list and Plan Your Portions (ADA)   Encouraged physical activity as approved by physician and record keeping    MONITORING/ EVALUATION/ PLAN:   Pt verbalized understanding of all materials covered   Pt asked pertinent questions throughout the session - expect compliance with nutrition guidelines presented   Provided pt with contact information should questions arise prior to next visit   Will f/u with pt weekly  Salima Powers MS, RDN, LD  8/3/2022

## 2022-08-12 ENCOUNTER — OFFICE VISIT (OUTPATIENT)
Dept: BARIATRICS/WEIGHT MGMT | Age: 57
End: 2022-08-12

## 2022-08-12 DIAGNOSIS — E66.9 OBESITY (BMI 30.0-34.9): Primary | ICD-10-CM

## 2022-08-12 PROCEDURE — 99999 PR OFFICE/OUTPT VISIT,PROCEDURE ONLY: CPT

## 2022-08-12 NOTE — PROGRESS NOTES
[facility] - Non-Surgical Weight Loss Program    REASON FOR VISIT:    Chief Complaint:    Chief Complaint   Patient presents with    Weight Management     Weigh in         OBJECTIVE:  Physical Exam   LMP 07/09/2016        Patient lost 2.8 pounds.

## 2022-08-24 ENCOUNTER — OFFICE VISIT (OUTPATIENT)
Dept: BARIATRICS/WEIGHT MGMT | Age: 57
End: 2022-08-24

## 2022-08-24 VITALS — HEIGHT: 63 IN | WEIGHT: 194 LBS | BODY MASS INDEX: 34.38 KG/M2

## 2022-08-24 DIAGNOSIS — E66.9 OBESITY (BMI 30.0-34.9): Primary | ICD-10-CM

## 2022-08-24 PROCEDURE — 99999 PR OFFICE/OUTPT VISIT,PROCEDURE ONLY: CPT

## 2022-09-07 ENCOUNTER — OFFICE VISIT (OUTPATIENT)
Dept: BARIATRICS/WEIGHT MGMT | Age: 57
End: 2022-09-07

## 2022-09-07 VITALS — WEIGHT: 191 LBS | BODY MASS INDEX: 33.84 KG/M2 | HEIGHT: 63 IN

## 2022-09-07 DIAGNOSIS — E66.9 OBESITY (BMI 30.0-34.9): Primary | ICD-10-CM

## 2022-09-07 PROCEDURE — 99999 PR OFFICE/OUTPT VISIT,PROCEDURE ONLY: CPT

## 2022-09-07 NOTE — PROGRESS NOTES
[facility] - Non-Surgical Weight Loss Program    REASON FOR VISIT:    Chief Complaint:    Chief Complaint   Patient presents with    Weight Management     Weigh in         OBJECTIVE:  Physical Exam   Ht 5' 2\" (1.575 m)   Wt 191 lb (86.6 kg)   LMP 07/09/2016   BMI 34.93 kg/m²                Patient lost 3lbs

## 2022-09-14 ENCOUNTER — OFFICE VISIT (OUTPATIENT)
Dept: BARIATRICS/WEIGHT MGMT | Age: 57
End: 2022-09-14

## 2022-09-14 VITALS — WEIGHT: 192 LBS | BODY MASS INDEX: 34.56 KG/M2

## 2022-09-14 DIAGNOSIS — E66.9 OBESITY (BMI 30.0-34.9): Primary | ICD-10-CM

## 2022-09-14 PROCEDURE — 99999 PR OFFICE/OUTPT VISIT,PROCEDURE ONLY: CPT | Performed by: SURGERY

## 2022-09-14 NOTE — PROGRESS NOTES
[facility] - Non-Surgical Weight Loss Program    REASON FOR VISIT:    Chief Complaint:    Chief Complaint   Patient presents with    Weight Management     Weigh In         OBJECTIVE:  Physical Exam   Wt 192 lb (87.1 kg)   LMP 07/09/2016   BMI 34.56 kg/m²          Patient gained 1lbs

## 2022-09-21 ENCOUNTER — OFFICE VISIT (OUTPATIENT)
Dept: BARIATRICS/WEIGHT MGMT | Age: 57
End: 2022-09-21

## 2022-09-21 VITALS — HEIGHT: 63 IN | BODY MASS INDEX: 34.2 KG/M2 | WEIGHT: 193 LBS

## 2022-09-21 DIAGNOSIS — E66.9 OBESITY (BMI 30.0-34.9): Primary | ICD-10-CM

## 2022-09-21 PROCEDURE — 99999 PR OFFICE/OUTPT VISIT,PROCEDURE ONLY: CPT

## 2022-09-21 NOTE — PROGRESS NOTES
[facility] - Non-Surgical Weight Loss Program    REASON FOR VISIT:    Chief Complaint:    Chief Complaint   Patient presents with    Weight Management     Weigh In         OBJECTIVE:  Physical Exam   Ht 5' 2.5\" (1.588 m)   Wt 193 lb (87.5 kg)   LMP 07/09/2016   BMI 34.74 kg/m²                Patient gained 1lb

## 2022-09-28 ENCOUNTER — OFFICE VISIT (OUTPATIENT)
Dept: BARIATRICS/WEIGHT MGMT | Age: 57
End: 2022-09-28

## 2022-09-28 VITALS — HEIGHT: 63 IN | WEIGHT: 191.6 LBS | BODY MASS INDEX: 33.95 KG/M2

## 2022-09-28 DIAGNOSIS — E66.9 OBESITY (BMI 30.0-34.9): Primary | ICD-10-CM

## 2022-09-28 PROCEDURE — 99999 PR OFFICE/OUTPT VISIT,PROCEDURE ONLY: CPT

## 2022-09-28 NOTE — PROGRESS NOTES
[facility] - Non-Surgical Weight Loss Program    REASON FOR VISIT:    Chief Complaint:    Chief Complaint   Patient presents with    Weight Management     Weigh In         OBJECTIVE:  Physical Exam   Ht 5' 2.5\" (1.588 m)   Wt 191 lb 9.6 oz (86.9 kg)   LMP 07/09/2016   BMI 34.49 kg/m²                Patient lost 0.6lbs

## 2022-10-05 ENCOUNTER — OFFICE VISIT (OUTPATIENT)
Dept: BARIATRICS/WEIGHT MGMT | Age: 57
End: 2022-10-05

## 2022-10-05 VITALS — HEIGHT: 63 IN | BODY MASS INDEX: 33.66 KG/M2 | WEIGHT: 190 LBS

## 2022-10-05 DIAGNOSIS — E66.9 OBESITY (BMI 30.0-34.9): Primary | ICD-10-CM

## 2022-10-05 PROCEDURE — 99999 PR OFFICE/OUTPT VISIT,PROCEDURE ONLY: CPT

## 2022-10-05 NOTE — PROGRESS NOTES
[facility] - Non-Surgical Weight Loss Program    REASON FOR VISIT:    Chief Complaint:    Chief Complaint   Patient presents with    Weight Management     Weigh In         OBJECTIVE:  Physical Exam   Ht 5' 2.5\" (1.588 m)   Wt 190 lb (86.2 kg)   LMP 07/09/2016   BMI 34.20 kg/m²                Patient lost 1.6lbs

## 2022-10-19 ENCOUNTER — OFFICE VISIT (OUTPATIENT)
Dept: BARIATRICS/WEIGHT MGMT | Age: 57
End: 2022-10-19

## 2022-10-19 VITALS — BODY MASS INDEX: 35.08 KG/M2 | WEIGHT: 198 LBS | HEIGHT: 63 IN

## 2022-10-19 DIAGNOSIS — E66.9 OBESITY (BMI 30.0-34.9): Primary | ICD-10-CM

## 2022-10-19 PROCEDURE — 99999 PR OFFICE/OUTPT VISIT,PROCEDURE ONLY: CPT | Performed by: SURGERY

## 2022-10-19 NOTE — PROGRESS NOTES
[facility] - Non-Surgical Weight Loss Program    REASON FOR VISIT:    Chief Complaint:    Chief Complaint   Patient presents with    Weight Management     Weigh In         OBJECTIVE:  Physical Exam   Ht 5' 2.5\" (1.588 m)   Wt 198 lb (89.8 kg)   LMP 07/09/2016   BMI 35.64 kg/m²                Patient gained 8lbs

## 2022-10-21 ENCOUNTER — OFFICE VISIT (OUTPATIENT)
Dept: ORTHOPEDIC SURGERY | Age: 57
End: 2022-10-21
Payer: COMMERCIAL

## 2022-10-21 VITALS
SYSTOLIC BLOOD PRESSURE: 132 MMHG | HEART RATE: 82 BPM | BODY MASS INDEX: 33.49 KG/M2 | DIASTOLIC BLOOD PRESSURE: 92 MMHG | WEIGHT: 189 LBS | HEIGHT: 63 IN

## 2022-10-21 DIAGNOSIS — M17.12 PRIMARY OSTEOARTHRITIS OF LEFT KNEE: ICD-10-CM

## 2022-10-21 DIAGNOSIS — M17.11 PRIMARY OSTEOARTHRITIS OF RIGHT KNEE: Primary | ICD-10-CM

## 2022-10-21 PROCEDURE — 20610 DRAIN/INJ JOINT/BURSA W/O US: CPT | Performed by: PHYSICIAN ASSISTANT

## 2022-10-21 ASSESSMENT — ENCOUNTER SYMPTOMS
GASTROINTESTINAL NEGATIVE: 1
EYES NEGATIVE: 1
RESPIRATORY NEGATIVE: 1

## 2022-10-21 NOTE — PROGRESS NOTES
Review of Systems   Constitutional: Negative. HENT: Negative. Eyes: Negative. Respiratory: Negative. Cardiovascular: Negative. Gastrointestinal: Negative. Genitourinary: Negative. Musculoskeletal:  Positive for arthralgias and myalgias. Skin: Negative. Neurological: Negative. Psychiatric/Behavioral: Negative. HPI:  Jasiel Blanc is a 65 y.o. female who presents the office today complaining of bilateral knee pain. She has known arthritis in both knees. She knows that she needs bilateral knee replacement but is unable to do it at this time because of her social situation. She has significant pain in both knees with the left knee hurting worse than the right. She rates her left knee pain today at an 8/10. Past Medical History:   Diagnosis Date    Diabetes mellitus ([facility] [address]     History of nuclear stress test 06/23/2016    cardiolite-normal,EF65%    Hx of echocardiogram 06/23/2016    EF >55%. Normal chamber sizes. LVH with normal LVSF. Mild MR/TR. Normal sized abdominal aorta at 2.1cm. Hyperlipidemia     Hypertension     Obesity (BMI 30-39. 9)     Shoulder pain, bilateral     SOB (shortness of breath)        No past surgical history on file. No family history on file. Social History     Socioeconomic History    Marital status:    Tobacco Use    Smoking status: Never    Smokeless tobacco: Never   Vaping Use    Vaping Use: Never used   Substance and Sexual Activity    Alcohol use: No     Alcohol/week: 0.0 standard drinks    Drug use: No       Current Outpatient Medications   Medication Sig Dispense Refill    lidocaine (LIDODERM) 5 % Place 1 patch onto the skin daily 12 hours on, 12 hours off. 3 patch 0    methocarbamol (ROBAXIN) 500 MG tablet Take 1 tablet by mouth 3 times daily As needed for muscle spasm.  9 tablet 0    naproxen (NAPROSYN) 500 MG tablet Take 1 tablet by mouth 2 times daily as needed for Pain 20 tablet 0    permethrin (ELIMITE) 5 % cream Apply topically as directed 1 Tube 0    hydrocortisone acetate 1 % CREA Apply 1 Tube topically three times daily Until rash resolves, no longer than 3 weeks. 1 Tube 0    insulin NPH (HUMULIN N;NOVOLIN N) 100 UNIT/ML injection vial Inject 50 Units into the skin 2 times daily (before meals)      insulin regular (HUMULIN R;NOVOLIN R) 100 UNIT/ML injection Inject 70 Units into the skin See Admin Instructions With lunch      losartan (COZAAR) 50 MG tablet Take 50 mg by mouth 2 times daily      simvastatin (ZOCOR) 20 MG tablet Take 20 mg by mouth nightly      glucose blood VI test strips (ASCENSIA AUTODISC VI;ONE TOUCH ULTRA TEST VI) strip 1 each by In Vitro route daily As needed. No current facility-administered medications for this visit. Allergies   Allergen Reactions    Nuts [Peanut-Containing Drug Products] Anaphylaxis    Shellfish-Derived Products Anaphylaxis    Lisinopril Other (See Comments)     lathargic    Metformin And Related Diarrhea       Review of Systems:  See above      Physical Exam:   BP (!) 132/92   Pulse 82   Ht 5' 2.5\" (1.588 m)   Wt 189 lb (85.7 kg)   LMP 07/09/2016   BMI 34.02 kg/m²        Gait is Antalgic. The patient can bear weight on the injured extremity. Gen/Psych:Examination reveals a pleasant individual in no acute distress. The patient is oriented to time, place and person. The patient's mood and affect are appropriate. Patient appears well nourished. Body habitus is overweight     Lymph:  no lymphedema in bilateral lower extremities     Skin intact in bilateral lower extremities with no ulcerations, lesions, rash, erythema. Vascular: There are mild varicosities in bilateral lower extremities, sensation intac to light touch over bilateral lower extremities.       bilateral leg/knee exam:  Leg alignment:     Valgus alignment  Quadriceps/hamstring atrophy:   no  Knee effusion:    no   Knee erythema:   no  ROM:     Full, 0-120 degrees  Varus laxity at 0 and 30 injection site was cleansed with isopropyl alcohol and a band-aid was placed. Complications:  None, the patient tolerated the procedure well. Right knee injection procedure note    Pre-procedure diagnosis:  Right knee DJD    Post-procedure diagnosis:  same    Procedure: The planned procedure/risks/benefits/alternatives were discussed with the patient. Risks include, but are not limited to, increased pain, drug reaction, infection, bleeding, lack of improvement, neurovascular injury, and increased blood sugar levels. The patient understood and all of their questions were answered. The right knee was prepped with alcohol, then a 25 gauge needle was advanced into the inferior-lateral joint without difficulty. The joint was then injected with 1 ml 1% lidocaine, 1ml of Kenalog (40 mg/ml), 1ml of 0.5% bupivacaine. The injection site was cleansed with isopropyl alcohol and a band-aid was placed. Complications:  None, the patient tolerated the procedure well. Instructions: The patient was advised to rest the knee and decrease activity for the next 24 to 48 hours. May use prescription or OTC pain relievers as needed for any post-injection pain as well as ice.

## 2022-10-26 ENCOUNTER — OFFICE VISIT (OUTPATIENT)
Dept: BARIATRICS/WEIGHT MGMT | Age: 57
End: 2022-10-26

## 2022-10-26 VITALS — WEIGHT: 189.6 LBS | HEIGHT: 63 IN | BODY MASS INDEX: 33.59 KG/M2

## 2022-10-26 DIAGNOSIS — E66.9 OBESITY (BMI 30.0-34.9): Primary | ICD-10-CM

## 2022-10-26 PROCEDURE — 99999 PR OFFICE/OUTPT VISIT,PROCEDURE ONLY: CPT

## 2022-10-26 NOTE — PROGRESS NOTES
[facility] - Non-Surgical Weight Loss Program    REASON FOR VISIT:    Chief Complaint:    Chief Complaint   Patient presents with    Weight Management     Weigh In         OBJECTIVE:  Physical Exam   Ht 5' 2.5\" (1.588 m)   Wt 189 lb 9.6 oz (86 kg)   LMP 07/09/2016   BMI 34.13 kg/m²                Patient lost 8.4lbs

## 2022-11-02 ENCOUNTER — OFFICE VISIT (OUTPATIENT)
Dept: BARIATRICS/WEIGHT MGMT | Age: 57
End: 2022-11-02

## 2022-11-02 VITALS — BODY MASS INDEX: 33.42 KG/M2 | WEIGHT: 188.6 LBS | HEIGHT: 63 IN

## 2022-11-02 DIAGNOSIS — E66.9 OBESITY (BMI 30.0-34.9): Primary | ICD-10-CM

## 2022-11-02 PROCEDURE — 99999 PR OFFICE/OUTPT VISIT,PROCEDURE ONLY: CPT | Performed by: SURGERY

## 2022-11-02 NOTE — PROGRESS NOTES
[facility] - Non-Surgical Weight Loss Program    REASON FOR VISIT:    Chief Complaint:    Chief Complaint   Patient presents with    Weight Management     Weigh In         OBJECTIVE:  Physical Exam   Ht 5' 2.5\" (1.588 m)   Wt 188 lb 9.6 oz (85.5 kg)   LMP 07/09/2016   BMI 33.95 kg/m²                Patient lost 1.0lb

## 2022-11-09 ENCOUNTER — OFFICE VISIT (OUTPATIENT)
Dept: BARIATRICS/WEIGHT MGMT | Age: 57
End: 2022-11-09

## 2022-11-09 VITALS — WEIGHT: 189.4 LBS | BODY MASS INDEX: 33.56 KG/M2 | HEIGHT: 63 IN

## 2022-11-09 DIAGNOSIS — E66.9 OBESITY (BMI 30.0-34.9): Primary | ICD-10-CM

## 2022-11-09 PROCEDURE — 99999 PR OFFICE/OUTPT VISIT,PROCEDURE ONLY: CPT | Performed by: SURGERY

## 2022-11-09 NOTE — PROGRESS NOTES
[facility] - Non-Surgical Weight Loss Program    REASON FOR VISIT:    Chief Complaint:    Chief Complaint   Patient presents with    Weight Management     Weigh In         OBJECTIVE:  Physical Exam   Ht 5' 2.5\" (1.588 m)   Wt 189 lb 6.4 oz (85.9 kg)   LMP 07/09/2016   BMI 34.09 kg/m²                Patient gained 0.8 lbs

## 2022-11-23 ENCOUNTER — OFFICE VISIT (OUTPATIENT)
Dept: BARIATRICS/WEIGHT MGMT | Age: 57
End: 2022-11-23

## 2022-11-23 VITALS — WEIGHT: 194 LBS | HEIGHT: 63 IN | BODY MASS INDEX: 34.38 KG/M2

## 2022-11-23 DIAGNOSIS — E66.9 OBESITY (BMI 30.0-34.9): Primary | ICD-10-CM

## 2022-11-23 PROCEDURE — 99999 PR OFFICE/OUTPT VISIT,PROCEDURE ONLY: CPT | Performed by: SURGERY

## 2022-11-23 NOTE — PROGRESS NOTES
[facility] - Non-Surgical Weight Loss Program    REASON FOR VISIT:    Chief Complaint:    Chief Complaint   Patient presents with    Weight Management     Weigh in. OBJECTIVE:  Physical Exam   Ht 5' 2.5\" (1.588 m)   Wt 194 lb (88 kg)   LMP 07/09/2016   BMI 34.92 kg/m²      Patient gained 4.6 pounds.

## 2022-11-30 ENCOUNTER — OFFICE VISIT (OUTPATIENT)
Dept: BARIATRICS/WEIGHT MGMT | Age: 57
End: 2022-11-30

## 2022-11-30 VITALS — WEIGHT: 191.6 LBS | HEIGHT: 63 IN | BODY MASS INDEX: 33.95 KG/M2

## 2022-11-30 DIAGNOSIS — E66.9 OBESITY (BMI 30.0-34.9): Primary | ICD-10-CM

## 2022-11-30 NOTE — PROGRESS NOTES
[facility] - Non-Surgical Weight Loss Program    REASON FOR VISIT:    Chief Complaint:    Chief Complaint   Patient presents with    Weight Management     Weigh In         OBJECTIVE:  Physical Exam   Ht 5' 2.5\" (1.588 m)   Wt 191 lb 9.6 oz (86.9 kg)   LMP 07/09/2016   BMI 34.49 kg/m²                Patient lost 3.0lbs

## 2022-12-07 ENCOUNTER — OFFICE VISIT (OUTPATIENT)
Dept: BARIATRICS/WEIGHT MGMT | Age: 57
End: 2022-12-07

## 2022-12-07 VITALS — WEIGHT: 192.6 LBS | HEIGHT: 63 IN | BODY MASS INDEX: 34.12 KG/M2

## 2022-12-07 DIAGNOSIS — E66.9 OBESITY (BMI 30.0-34.9): Primary | ICD-10-CM

## 2022-12-07 PROCEDURE — 99999 PR OFFICE/OUTPT VISIT,PROCEDURE ONLY: CPT | Performed by: SURGERY

## 2022-12-07 NOTE — PROGRESS NOTES
[facility] - Non-Surgical Weight Loss Program    REASON FOR VISIT:    Chief Complaint:    Chief Complaint   Patient presents with    Weight Management     Weigh in         OBJECTIVE:  Physical Exam   Ht 5' 2.5\" (1.588 m)   Wt 192 lb 9.6 oz (87.4 kg)   LMP 07/09/2016   BMI 34.67 kg/m²                Patient gained 1lbs

## 2023-02-09 ENCOUNTER — OFFICE VISIT (OUTPATIENT)
Dept: ORTHOPEDIC SURGERY | Age: 58
End: 2023-02-09

## 2023-02-09 VITALS
HEIGHT: 63 IN | BODY MASS INDEX: 34 KG/M2 | RESPIRATION RATE: 16 BRPM | DIASTOLIC BLOOD PRESSURE: 111 MMHG | SYSTOLIC BLOOD PRESSURE: 170 MMHG | WEIGHT: 191.9 LBS | HEART RATE: 82 BPM

## 2023-02-09 DIAGNOSIS — M17.11 PRIMARY OSTEOARTHRITIS OF RIGHT KNEE: ICD-10-CM

## 2023-02-09 DIAGNOSIS — M17.12 PRIMARY OSTEOARTHRITIS OF LEFT KNEE: Primary | ICD-10-CM

## 2023-02-09 ASSESSMENT — ENCOUNTER SYMPTOMS
GASTROINTESTINAL NEGATIVE: 1
RESPIRATORY NEGATIVE: 1
EYES NEGATIVE: 1

## 2023-02-09 NOTE — PROGRESS NOTES
Review of Systems   Constitutional: Negative. HENT: Negative. Eyes: Negative. Respiratory: Negative. Cardiovascular: Negative. Gastrointestinal: Negative. Genitourinary: Negative. Musculoskeletal:  Positive for arthralgias and myalgias. Skin: Negative. Neurological: Negative. Psychiatric/Behavioral: Negative. HPI:  Odalys Bui is a 63 y.o. female returning to the office today with bilateral knee pain. She is well-known to this office and has severe degenerative changes in bilateral knees with bone-on-bone articulation in the lateral compartment. She would like repeat steroid injections today. Steroid injections were in October 2022. Past Medical History:   Diagnosis Date    Diabetes mellitus ([facility] [address]     History of nuclear stress test 06/23/2016    cardiolite-normal,EF65%    Hx of echocardiogram 06/23/2016    EF >55%. Normal chamber sizes. LVH with normal LVSF. Mild MR/TR. Normal sized abdominal aorta at 2.1cm. Hyperlipidemia     Hypertension     Obesity (BMI 30-39. 9)     Shoulder pain, bilateral     SOB (shortness of breath)        No past surgical history on file. No family history on file. Social History     Socioeconomic History    Marital status:      Spouse name: None    Number of children: None    Years of education: None    Highest education level: None   Tobacco Use    Smoking status: Never    Smokeless tobacco: Never   Vaping Use    Vaping Use: Never used   Substance and Sexual Activity    Alcohol use: No     Alcohol/week: 0.0 standard drinks    Drug use: No       Current Outpatient Medications   Medication Sig Dispense Refill    lidocaine (LIDODERM) 5 % Place 1 patch onto the skin daily 12 hours on, 12 hours off. 3 patch 0    methocarbamol (ROBAXIN) 500 MG tablet Take 1 tablet by mouth 3 times daily As needed for muscle spasm.  9 tablet 0    naproxen (NAPROSYN) 500 MG tablet Take 1 tablet by mouth 2 times daily as needed for Pain 20 tablet 0    permethrin (ELIMITE) 5 % cream Apply topically as directed 1 Tube 0    hydrocortisone acetate 1 % CREA Apply 1 Tube topically three times daily Until rash resolves, no longer than 3 weeks. 1 Tube 0    insulin NPH (HUMULIN N;NOVOLIN N) 100 UNIT/ML injection vial Inject 50 Units into the skin 2 times daily (before meals)      insulin regular (HUMULIN R;NOVOLIN R) 100 UNIT/ML injection Inject 70 Units into the skin See Admin Instructions With lunch      losartan (COZAAR) 50 MG tablet Take 50 mg by mouth 2 times daily      simvastatin (ZOCOR) 20 MG tablet Take 20 mg by mouth nightly      glucose blood VI test strips (ASCENSIA AUTODISC VI;ONE TOUCH ULTRA TEST VI) strip 1 each by In Vitro route daily As needed. No current facility-administered medications for this visit. 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 The patient can bear weight on the injured extremity. Gen/Psych:Examination reveals a pleasant individual in no acute distress. The patient is oriented to time, place and person. The patient's mood and affect are appropriate. Patient appears well nourished. Body habitus is overweight     Lymph:  no lymphedema in bilateral lower extremities     Skin intact in bilateral lower extremities with no ulcerations, lesions, rash, erythema. Vascular: There are mild varicosities in bilateral lower extremities, sensation intac to light touch over bilateral lower extremities.       bilateral leg/knee exam:  Leg alignment:     Valgus alignment  Quadriceps/hamstring atrophy:   no  Knee effusion:    no   Knee erythema:   no  ROM:     Full, 0-120 degrees  Varus laxity at 0 and 30 deg's: no  Valguslaxity at 0 and 30 deg's: no  Recurvatum:    no  Tenderness at:   Medial and lateral joint line tenderness          Bilateral Knee strength is 5/5 flexion and extension  There is + L2-S1 motor and sensory function in bilateral lower extremities    Outside record review: office notes and prior x-rays reviewed  Imaging studies:  4 views of the left knee and 4 views of the right knee reviewed in the office today. X-rays show severe degenerative changes in the lateral compartment of the left knee and severe degenerative changes of the lateral compartment of the right knee with overall valgus alignment seen on x-ray. Osteophyte formation and subchondral sclerosis seen within the lateral compartment of both knees. No acute fractures or dislocations noted. Impression:     Diagnosis Orders   1. Primary osteoarthritis of left knee  MT ARTHROCENTESIS ASPIR&/INJ MAJOR JT/BURSA W/O US      2. Primary osteoarthritis of right knee  MT ARTHROCENTESIS ASPIR&/INJ MAJOR JT/BURSA W/O US          Plan:     Patient Instructions   Continue to weight bear as tolerated  Continue range of motion   Ice and elevate as needed  Tylenol or motrin for pain   Injection given today in office   Rest for 24-48 hours  Follow up as needed    Left knee injection procedure note    Pre-procedure diagnosis:  Left knee DJD    Post-procedure diagnosis:  same    Procedure: The planned procedure/risks/benefits/alternatives were discussed with the patient. Risks include, but are not limited to, increased pain, drug reaction, infection, bleeding, lack of improvement, neurovascular injury, and increased blood sugar levels. The patient understood and all of their questions were answered.     The Left knee was prepped with alcohol then a 25 gauge needle was advanced into the inferior-lateral joint without difficulty. The joint was then injected with 1 ml 1% lidocaine, 1ml of Kenalog (40 mg/ml), 1ml 0.5% bupivacaine the injection site was cleansed with isopropyl alcohol and a band-aid was placed. Complications:  None, the patient tolerated the procedure well. Right knee injection procedure note    Pre-procedure diagnosis:  Right knee DJD    Post-procedure diagnosis:  same    Procedure: The planned procedure/risks/benefits/alternatives were discussed with the patient. Risks include, but are not limited to, increased pain, drug reaction, infection, bleeding, lack of improvement, neurovascular injury, and increased blood sugar levels. The patient understood and all of their questions were answered. The right knee was prepped with alcohol, then a 25 gauge needle was advanced into the inferior-lateral joint without difficulty. The joint was then injected with 1 ml 1% lidocaine, 1ml of Kenalog (40 mg/ml), 1ml of 0.5% bupivacaine. The injection site was cleansed with isopropyl alcohol and a band-aid was placed. Complications:  None, the patient tolerated the procedure well. Instructions: The patient was advised to rest the knee and decrease activity for the next 24 to 48 hours. May use prescription or OTC pain relievers as needed for any post-injection pain as well as ice.

## 2023-02-09 NOTE — PROGRESS NOTES
Marybeth Patel is returning to the office for continued care of her bilateral knee pain. She has Hx if OA bilaterally and last received an injection on 10.21.22 with moderate relief. She states started noticing symptoms returning after approximately 3 months. She rates her pain 7/10 and would like to discuss another steroid injection today.

## 2023-04-28 ENCOUNTER — TELEPHONE (OUTPATIENT)
Dept: ORTHOPEDIC SURGERY | Age: 58
End: 2023-04-28

## 2023-05-19 ENCOUNTER — TELEPHONE (OUTPATIENT)
Dept: ORTHOPEDIC SURGERY | Age: 58
End: 2023-05-19

## 2023-05-22 NOTE — TELEPHONE ENCOUNTER
Spoke with pt regarding gel injections she would like to proceed with steroid injections and discuss surgery. Appt scheduled with PA with week to discuss treatment.

## 2023-05-25 ENCOUNTER — OFFICE VISIT (OUTPATIENT)
Dept: ORTHOPEDIC SURGERY | Age: 58
End: 2023-05-25

## 2023-05-25 VITALS
WEIGHT: 190 LBS | HEART RATE: 87 BPM | OXYGEN SATURATION: 98 % | DIASTOLIC BLOOD PRESSURE: 94 MMHG | BODY MASS INDEX: 33.66 KG/M2 | RESPIRATION RATE: 16 BRPM | SYSTOLIC BLOOD PRESSURE: 162 MMHG | HEIGHT: 63 IN

## 2023-05-25 DIAGNOSIS — M17.12 ARTHRITIS OF KNEE, LEFT: ICD-10-CM

## 2023-05-25 DIAGNOSIS — M17.11 ARTHRITIS OF KNEE, RIGHT: ICD-10-CM

## 2023-05-25 DIAGNOSIS — M17.11 PRIMARY OSTEOARTHRITIS OF RIGHT KNEE: ICD-10-CM

## 2023-05-25 DIAGNOSIS — M17.12 PRIMARY OSTEOARTHRITIS OF LEFT KNEE: Primary | ICD-10-CM

## 2023-05-25 RX ORDER — PROCHLORPERAZINE 25 MG/1
SUPPOSITORY RECTAL
COMMUNITY
Start: 2023-04-24

## 2023-05-25 RX ORDER — LOSARTAN POTASSIUM 100 MG/1
TABLET ORAL
COMMUNITY
Start: 2023-04-27

## 2023-05-25 RX ORDER — DULAGLUTIDE 3 MG/.5ML
INJECTION, SOLUTION SUBCUTANEOUS
COMMUNITY
Start: 2023-05-22

## 2023-05-25 RX ORDER — OMEPRAZOLE 40 MG/1
CAPSULE, DELAYED RELEASE ORAL
COMMUNITY
Start: 2023-05-01

## 2023-05-25 RX ORDER — PIOGLITAZONEHYDROCHLORIDE 30 MG/1
TABLET ORAL
COMMUNITY
Start: 2023-05-22

## 2023-05-25 RX ORDER — SIMVASTATIN 10 MG
TABLET ORAL
COMMUNITY
Start: 2023-05-22

## 2023-05-25 RX ORDER — GLIPIZIDE 5 MG/1
TABLET, FILM COATED, EXTENDED RELEASE ORAL
COMMUNITY
Start: 2023-05-09

## 2023-05-25 RX ORDER — ASPIRIN 81 MG/1
81 TABLET, CHEWABLE ORAL DAILY
COMMUNITY

## 2023-05-25 ASSESSMENT — ENCOUNTER SYMPTOMS
COUGH: 0
FACIAL SWELLING: 0
RHINORRHEA: 0
BACK PAIN: 0
SHORTNESS OF BREATH: 0
NAUSEA: 0

## 2023-05-25 NOTE — PATIENT INSTRUCTIONS
Bilateral injections given today. Continue weight-bearing as tolerated. Continue range of motion exercises as instructed. Ice and elevate as needed. Tylenol or Motrin for pain. Follow up in 3 months for repeat injection if needed.

## 2023-05-25 NOTE — PROGRESS NOTES
Patient returns to the office with bilateral knee pain. Pt stated her knee pain has gotten worse since the last injection. Pt has been receiving injections from Tony Guzman. With significant relief. Pt stated there were no prior injuries and describes the pain as aching and throbbing. Pt stated that her pain in her left knee is worse than the right.

## 2023-05-25 NOTE — PROGRESS NOTES
5/25/2023   Chief Complaint   Patient presents with    Knee Pain     Bilateral knee injections         History of Present Illness:                             Allison Oconnell is a 63 y.o. female returning the office today for continued management of bilateral knee pain. Patient states that she has been seeing Sulma Alvarez in our office and at her appointment location messed up and therefore is here seeing me today. She states she has received cortisone injections in the past and they have significantly helped her pain. However, she does note that her continued knee pain has left her willing to consider total knee replacement. She would like injections today to help her symptomatically but she would also like to speak to one of the orthopedic surgeons in the near future to discuss beginning the process of getting her knees replaced. Patient returns to the office with bilateral knee pain. Pt stated her knee pain has gotten worse since the last injection. Pt has been receiving injections from Sulma [place], [place]. With significant relief. Pt stated there were no prior injuries and describes the pain as aching and throbbing. Pt stated that her pain in her left knee is worse than the right. Medical History  Patient's medications, allergies, past medical, surgical, social and family histories were reviewed and updated as appropriate. Review of Systems   Constitutional:  Negative for fever. HENT:  Negative for facial swelling and rhinorrhea. Respiratory:  Negative for cough and shortness of breath. Cardiovascular:  Negative for chest pain. Gastrointestinal:  Negative for nausea. Musculoskeletal:  Positive for arthralgias, gait problem and joint swelling. Negative for back pain, myalgias, neck pain and neck stiffness. Skin:  Negative for pallor and rash. Neurological:  Negative for facial asymmetry and speech difficulty. Psychiatric/Behavioral:  Negative for agitation and confusion.

## 2023-08-28 ENCOUNTER — OFFICE VISIT (OUTPATIENT)
Dept: ORTHOPEDIC SURGERY | Age: 58
End: 2023-08-28
Payer: COMMERCIAL

## 2023-08-28 VITALS
WEIGHT: 190 LBS | BODY MASS INDEX: 33.66 KG/M2 | OXYGEN SATURATION: 98 % | RESPIRATION RATE: 15 BRPM | HEIGHT: 63 IN | HEART RATE: 74 BPM

## 2023-08-28 DIAGNOSIS — M17.12 PRIMARY OSTEOARTHRITIS OF LEFT KNEE: ICD-10-CM

## 2023-08-28 DIAGNOSIS — M17.11 PRIMARY OSTEOARTHRITIS OF RIGHT KNEE: Primary | ICD-10-CM

## 2023-08-28 PROCEDURE — G8427 DOCREV CUR MEDS BY ELIG CLIN: HCPCS | Performed by: ORTHOPAEDIC SURGERY

## 2023-08-28 PROCEDURE — G8417 CALC BMI ABV UP PARAM F/U: HCPCS | Performed by: ORTHOPAEDIC SURGERY

## 2023-08-28 PROCEDURE — 20610 DRAIN/INJ JOINT/BURSA W/O US: CPT | Performed by: ORTHOPAEDIC SURGERY

## 2023-08-28 PROCEDURE — 3017F COLORECTAL CA SCREEN DOC REV: CPT | Performed by: ORTHOPAEDIC SURGERY

## 2023-08-28 PROCEDURE — 99203 OFFICE O/P NEW LOW 30 MIN: CPT | Performed by: ORTHOPAEDIC SURGERY

## 2023-08-28 PROCEDURE — 1036F TOBACCO NON-USER: CPT | Performed by: ORTHOPAEDIC SURGERY

## 2023-08-28 RX ORDER — TRIAMCINOLONE ACETONIDE 40 MG/ML
40 INJECTION, SUSPENSION INTRA-ARTICULAR; INTRAMUSCULAR ONCE
Status: COMPLETED | OUTPATIENT
Start: 2023-08-28 | End: 2023-08-28

## 2023-08-28 RX ORDER — EPINEPHRINE 0.3 MG/.3ML
INJECTION SUBCUTANEOUS
COMMUNITY
Start: 2023-08-14

## 2023-08-28 RX ORDER — LORATADINE 10 MG/1
TABLET ORAL
COMMUNITY
Start: 2023-08-14

## 2023-08-28 RX ADMIN — TRIAMCINOLONE ACETONIDE 40 MG: 40 INJECTION, SUSPENSION INTRA-ARTICULAR; INTRAMUSCULAR at 13:48

## 2023-08-28 ASSESSMENT — ENCOUNTER SYMPTOMS
ABDOMINAL PAIN: 0
CHEST TIGHTNESS: 0
BACK PAIN: 0
COLOR CHANGE: 0
EYE REDNESS: 0

## 2023-08-28 NOTE — PROGRESS NOTES
Patient returns to the office today for FU of the bilateral knee pain. Last injections given on 5/25/23. Pt states the injections due help with her pain R>L she would like to discuss a TKA but is willing to wait a few months to let her wrist heal due to a recent fall.  Pt states she is in constant pain in the knee and will have a difficult time getting up from a sitting position
erythema, ecchymosis or lacerations present. 0-130  Mild valgus deformity, soft endpoint of the MCL    XRAY  X-ray 3 views of the bilateral knees from October 21, 2022 reviewed by me today in the office demonstrates age appropriate bone density throughout with severe degenerative changes with medial, lateral and patellofemoral joint space collapse with mild valgus deformity, moderate lateral and patellofemoral osteophyte formation, normal tracking of the patella, no acute osseous abnormalities. Assessment:      Right knee OA, severe  Right knee valgus deformity, mild to moderate  Left knee OA, severe  Left knee valgus deformity, mild to moderate      Plan:      I discussed with her today her x-ray findings. I explained to her that she does have severe arthritis in both of her knees. At this point I recommend that we begin with conservative treatment. I discussed performing a cortisone injection with the patient today. The patient agrees and would like to proceed with the cortisone injection. I explained to them that we can repeat these injections every 3 months if needed. I did briefly discuss knee replacement surgery with her today including possible CPS knee if necessary. Continue weight-bearing as tolerated. Continue range of motion exercises as instructed. Ice and elevate as needed. Tylenol or Motrin for pain. Follow up in 2 months for recheck and further discussion of scheduling surgical treatment for her right knee. She will need new x-rays of the bilateral knees at her next visit. Knee Injection Procedure Note    Pre-operative Diagnosis: Right knee DJD    Post-operative Diagnosis: same    Indications: Symptom relief from osteoarthritis    Anesthesia: Lidocaine 1% and marcaine 0.5% without epinephrine without added sodium bicarbonate    Procedure Details     Verbal consent was obtained for the procedure. The joint was prepped with alcohol.  A 22 gauge needle was inserted into the superior

## 2023-11-20 ENCOUNTER — OFFICE VISIT (OUTPATIENT)
Dept: ORTHOPEDIC SURGERY | Age: 58
End: 2023-11-20
Payer: COMMERCIAL

## 2023-11-20 VITALS
DIASTOLIC BLOOD PRESSURE: 98 MMHG | BODY MASS INDEX: 34.75 KG/M2 | SYSTOLIC BLOOD PRESSURE: 145 MMHG | HEART RATE: 86 BPM | HEIGHT: 62 IN | OXYGEN SATURATION: 98 %

## 2023-11-20 DIAGNOSIS — M17.11 LOCALIZED OSTEOARTHRITIS OF RIGHT KNEE: Primary | ICD-10-CM

## 2023-11-20 DIAGNOSIS — M17.10 LOCALIZED OSTEOARTHRITIS OF KNEE: ICD-10-CM

## 2023-11-20 PROCEDURE — G8427 DOCREV CUR MEDS BY ELIG CLIN: HCPCS | Performed by: ORTHOPAEDIC SURGERY

## 2023-11-20 PROCEDURE — 3077F SYST BP >= 140 MM HG: CPT | Performed by: ORTHOPAEDIC SURGERY

## 2023-11-20 PROCEDURE — G8484 FLU IMMUNIZE NO ADMIN: HCPCS | Performed by: ORTHOPAEDIC SURGERY

## 2023-11-20 PROCEDURE — 99214 OFFICE O/P EST MOD 30 MIN: CPT | Performed by: ORTHOPAEDIC SURGERY

## 2023-11-20 PROCEDURE — 1036F TOBACCO NON-USER: CPT | Performed by: ORTHOPAEDIC SURGERY

## 2023-11-20 PROCEDURE — 3080F DIAST BP >= 90 MM HG: CPT | Performed by: ORTHOPAEDIC SURGERY

## 2023-11-20 PROCEDURE — G8417 CALC BMI ABV UP PARAM F/U: HCPCS | Performed by: ORTHOPAEDIC SURGERY

## 2023-11-20 PROCEDURE — 3017F COLORECTAL CA SCREEN DOC REV: CPT | Performed by: ORTHOPAEDIC SURGERY

## 2023-11-20 ASSESSMENT — ENCOUNTER SYMPTOMS
ABDOMINAL PAIN: 0
CHEST TIGHTNESS: 0
EYE REDNESS: 0
COLOR CHANGE: 0
BACK PAIN: 0

## 2023-11-20 NOTE — PROGRESS NOTES
Subjective:      Patient ID: Kevin Longoria is a 63 y.o. female. Patient seen in office today for RT knee pain. Would like to discuss TKA. Stated injections don't help. No new injuries or concerns since last office visit. 6-7/10 pain level in RT knee today. She comes in today for a recheck of her bilateral knee pain, right worse than left. She states that she has been dealing with progressively worsening deep, aching and throbbing pain globally in both of her knees for many years. She states that now the pain is getting to the point that she is having difficulty standing and perform her daily activities and the pain is intolerable. She states that the injections are no longer helping with her pain. She has continued working on weight loss with diet and exercise. Patient denies any new injury to the involved extremity/ joint, denies numbness or tingling in the involved extremity and denies fever or chills. She would like to discuss proceeding with surgical treatment at this point. Review of Systems   Constitutional:  Negative for activity change, chills and fever. HENT:  Negative for congestion and drooling. Eyes:  Negative for redness. Respiratory:  Negative for chest tightness. Cardiovascular:  Negative for chest pain. Gastrointestinal:  Negative for abdominal pain. Endocrine: Negative for cold intolerance and heat intolerance. Musculoskeletal:  Positive for arthralgias, gait problem, joint swelling and myalgias. Negative for back pain. Skin:  Negative for color change, pallor, rash and wound. Neurological:  Negative for weakness and numbness. Psychiatric/Behavioral:  Negative for confusion. Past Medical History:   Diagnosis Date    Diabetes mellitus ([address] [facility])     History of nuclear stress test 06/23/2016    cardiolite-normal,EF65%    Hx of echocardiogram 06/23/2016    EF >55%. Normal chamber sizes. LVH with normal LVSF. Mild MR/TR.  Normal sized abdominal aorta at

## 2023-11-20 NOTE — PATIENT INSTRUCTIONS
We will schedule surgery at soonest convenience. If you have any questions regarding your surgery please call our office and ask to speak with Drew Garcia [phone]     We are committed to providing you the best care possible. If you receive a survey after visiting one of our offices, please take time to share your experience concerning your physician office visit. These surveys are confidential and no health information about you is shared. We are eager to improve for you and we are counting on your feedback to help make that happen.

## 2023-11-20 NOTE — PROGRESS NOTES
Patient seen in office today for RT knee pain. Would like to discuss TKA. Stated injections don't help. No new injuries or concerns since last office visit. 6-7/10 pain level in RT knee today.

## 2023-11-22 ENCOUNTER — TELEPHONE (OUTPATIENT)
Dept: ORTHOPEDIC SURGERY | Age: 58
End: 2023-11-22

## 2023-11-22 NOTE — TELEPHONE ENCOUNTER
March 16, 2021     Anne-Marie Polanco MD  [address]  [facility]    Patient: Kimi Nevarez   YOB: 2011   Date of Visit: 3/15/2021       Dear Dr Derrill Lefort Clugston: Thank you for referring Kimi Nevarez to me for evaluation  Below are my notes for this consultation  If you have questions, please do not hesitate to call me  I look forward to following your patient along with you  Sincerely,        James Charles MD        CC: No Recipients  James Charles MD  3/16/2021  8:55 AM  Sign when Signing Visit      Pediatric Nephrology Chronic Kidney Disease Follow Up  Name:Ryland Thao  OIU:[id number]  Date: 3/15/21      Assessment/Plan   Assessment:  6year old with history of HUS and UPJ obstruction with CKD stage 1 here for follow up  Plan:     Patient Instructions   Eugenia Arevalo has been doing really well since his last visit in Nephrology Clinic  Blood pressure today is within normal limits  Lab work shows stable kidney function and no protein in his urine which is all very reassuring  I would like for him to see Physical therapy for his lower back pain to help work on some strengthening exercises for his core  Will plan to have him repeat labs in nine months with follow-up afterwards  Diagnoses and all orders for this visit:    Chronic kidney disease (CKD) stage G1/A1, glomerular filtration rate (GFR) equal to or greater than 90 mL/min/1 73 square meter and albuminuria creatinine ratio less than 30 mg/g  -     CBC and differential; Future  -     Basic metabolic panel; Future  -     Microalbumin / creatinine urine ratio; Future  -     Urinalysis with microscopic; Future    Other orders  -     budesonide (Pulmicort Flexhaler) 180 MCG/ACT inhaler; Inhale 1 puff daily      Anemia: on recent CBC- hemoglobin is normal with no evidence of anemia at this time  CKD-MB: PTH, Calcium and phosphorus within normal range  Vitamin D sufficient     Electrolytes: Normal Patient scheduled for    Right Total Knee Arthroplasty  Date: 1/4/24  Facility: [facility]  Surgeon: Axel Palacio DO  Product: Roma    Surgery Request created/emailed 11/22/23  Pathway Orders scanned 11/22/23  PCP Clearance routed via imagine to Dr. Shaniqua Savage 11/22/23  Cardiac Clearance routed via [employer] Dr. Geraldine Menchaca 11/22/23    Pre Op Appt 11/20/23    Wang Mckeon  Contacted 11/21/23 on [facility] with clinicals uploaded  Status: Pending CPT 37395 ICD M17.11/M25.561 for outpatient  Auth# [id number]    [facility] PAT and Joint Screening    Pt requesting home health physical therapy upon d/c from hospital. electrolytes without any evidence of acidosis or hyperkalemia  Hypertension: blood pressure is normal today  HPI: Nik Castillo is a 6 y o male who presents for follow up of   Chief Complaint   Patient presents with    Follow-up     Nik Castillo is accompanied by His mother who assists in providing the history today  Sigifredo Tipton states that he has been doing well since his last visit in nephrology clinic  Mom states that the fevers that he had at the end of January had resolved without any additional symptoms  Sigifredo Tipton continues to complain intermittently of back pain  The pain is more on the left than right and does not seem to be related specifically to a type of activity or time of day  The area of discomfort is specifically along the paraspinal muscle areas bilaterally  Good appetite  Working on increasing his fluid intake but overall seems to be doing better  Review of Systems  Constitutional:   Negative for fevers, fatigue  HEENT: negative for rhinorrhea, congestion or sore throat  Respiratory: negative for cough or shortness of breath? ?  Cardiovascular: negative for chest pain, facial or lower extremity edema  Gastrointestinal: negative for abdominal pain, constipation  Genitourinary: negative for dysuria, hematuria  Endocrine: negative for changes in weight  Musculoskeletal: negative for joint pain or swelling  Neurologic: negative for headache, dizziness  Hematologic: negative for bruising or bleeding  Integumentary: negative for rashes  Psychiatric/Behavioral: no behavioral changes    The remainder of review of systems as noted per HPI  ?  ?     Past Medical History:   Diagnosis Date    Adopted     Asthma     Bloody stool     C  difficile colitis     Congenital obstruction of ureteropelvic junction     Dehydration     Dermatitis     Developmental delay     Diarrhea     Environmental and seasonal allergies     Febrile seizures (HCC)     Fever     Flu     A & B 2019    Hemolytic uremic syndrome (HCC)     Hydronephrosis     Right kidney     Kidney atrophy     Lyme disease     Nausea     Nocturnal enuresis     Sever's disease     Severe headache     Urinary frequency     UTI (urinary tract infection)     Vomiting      Past Surgical History:   Procedure Laterality Date    MEATOPLASTY      PYELOPLASTY      TONSILECTOMY AND ADNOIDECTOMY       Family History   Adopted: Yes   Family history unknown: Yes     Social History     Socioeconomic History    Marital status: Single     Spouse name: Not on file    Number of children: Not on file    Years of education: Not on file    Highest education level: Not on file   Occupational History    Not on file   Social Needs    Financial resource strain: Not on file    Food insecurity     Worry: Not on file     Inability: Not on file    Transportation needs     Medical: Not on file     Non-medical: Not on file   Tobacco Use    Smoking status: Never Smoker    Smokeless tobacco: Never Used   Substance and Sexual Activity    Alcohol use: Not on file    Drug use: Not on file    Sexual activity: Not on file   Lifestyle    Physical activity     Days per week: Not on file     Minutes per session: Not on file    Stress: Not on file   Relationships    Social connections     Talks on phone: Not on file     Gets together: Not on file     Attends [employer] service: Not on file     Active member of club or organization: Not on file     Attends meetings of clubs or organizations: Not on file     Relationship status: Not on file    Intimate partner violence     Fear of current or ex partner: Not on file     Emotionally abused: Not on file     Physically abused: Not on file     Forced sexual activity: Not on file   Other Topics Concern    Not on file   Social History Narrative    Pt lives with mother, brother, and sister  Pt is adopted         Allergies   Allergen Reactions    Ibuprofen Other (See Comments)     No allergy,hx renal failure so cant have    Born with hydronephrosis only 1 functioning kidney  CAN NOT TAKE DUE TO KIDNEY ISSUES  May not have ibuprofen due to kidney problems    No allergy,hx renal failure so cant have  Born with hydronephrosis only 1 functioning kidney  CAN NOT TAKE DUE TO KIDNEY ISSUES        Current Outpatient Medications:     albuterol (2 5 mg/3 mL) 0 083 % nebulizer solution, TAKE 3 ML (2 5 MG TOTAL) BY NEBULIZATION EVERY 4 (FOUR) HOURS AS NEEDED FOR WHEEZING , Disp: , Rfl:     budesonide (Pulmicort Flexhaler) 180 MCG/ACT inhaler, Inhale 2 puffs daily, Disp: , Rfl:     budesonide (Pulmicort Flexhaler) 180 MCG/ACT inhaler, Inhale 1 puff daily, Disp: , Rfl:     loratadine (CLARITIN) 10 mg tablet, Take 10 mg by mouth daily, Disp: , Rfl:     Pediatric Multivit-Minerals-C (MULTIVITAMINS PEDIATRIC PO), Take 1 tablet by mouth daily, Disp: , Rfl:     oxyCODONE-acetaminophen (ROXICET) 5-325 mg/5 mL solution, Take 5 mL by mouth every 4 (four) hours as needed for moderate pain, Disp: , Rfl:      Objective   Vitals:    03/15/21 1549   BP: (!) 94/60   Pulse: (!) 103     Blood pressure percentiles are 26 % systolic and 48 % diastolic based on the 0418 AAP Clinical Practice Guideline  Blood pressure percentile targets: 90: 111/74, 95: 115/77, 95 + 12 mmH/89  This reading is in the normal blood pressure range  4' 6" (1 372 m)  30 8 kg (67 lb 12 8 oz)  Body mass index is 16 35 kg/m²  Physical Exam:  General: Awake, alert and in no acute distress  HEENT:  Normocephalic, atraumatic, pupils equally round and reactive to light, extraocular movement intact, conjunctiva clear with no discharge  Ears normally set with tympanic membranes visualized  Tympanic membranes without erythema or effusion and canals clear  Nares patent with no discharge  Mucous membranes moist and oropharynx is clear with no erythema or exudate present  Normal dentition  Neck: supple, symmetric with no masses    No lymphadenopathy  Lungs: clear to auscultation bilaterally with no wheezes, rales or rhonchi  Cardiovascular:   Normal S1 and S2  No murmurs, rubs or gallops  Regular rate and rhythm  Abdomen:  Soft, nontender, and nondistended  Normoactive bowel sounds  No hepatosplenomegaly present  Genitourinary:  Deferred  Back: No point tenderness with palpation  No CVA tenderness bilaterally  Skin: warm and well perfused  No rashes present  Extremities:  No cyanosis, clubbing or edema  Pulses 2+ bilaterally  Musculoskeletal:   Full range of motion all four extremities  No joint swelling or tenderness noted  Neurologic: grossly normal neurologic exam with no deficits noted  Psychiatric: normal mood and affect     Lab Results:   CMP (2/14): creatinine of 0 65 with normal electrolytes  CBC: hemoglobin 11 9 with normal hematocrit, WBC and platelet count  Urinalysis: negative for blood and protein  Urine microalbumin/creatinine ratio: 18 7  PTH: 42 8  25 hydroxy vitamin D: 39  Imaging: abdominal ultrasound in February for back pain report reviewed- no hydronephrosis or stones  Other Studies: none    All laboratory results and imaging was reviewed by me and summarized above

## 2023-11-27 ENCOUNTER — TELEPHONE (OUTPATIENT)
Dept: CARDIOLOGY CLINIC | Age: 58
End: 2023-11-27

## 2023-11-29 ENCOUNTER — TELEPHONE (OUTPATIENT)
Dept: CARDIOLOGY CLINIC | Age: 58
End: 2023-11-29

## 2023-11-29 NOTE — TELEPHONE ENCOUNTER
Left a message to call and set up New Patient   Appt per [facility] cardiac clearance  2 nd message

## 2023-12-15 ENCOUNTER — TELEPHONE (OUTPATIENT)
Dept: ORTHOPEDIC SURGERY | Age: 58
End: 2023-12-15

## 2023-12-15 NOTE — TELEPHONE ENCOUNTER
Patient called into office wanting to cancel sx date on 1/4/24 for RT TKA d/t needing dental work prior to sx. She will call the office back when she wants to reschedule, Marcelino Sanchez notified via Teams.

## 2023-12-20 ENCOUNTER — HOSPITAL ENCOUNTER (OUTPATIENT)
Dept: PREADMISSION TESTING | Age: 58
Discharge: HOME | End: 2023-12-20

## 2023-12-20 ENCOUNTER — HOSPITAL ENCOUNTER (OUTPATIENT)
Dept: PREADMISSION TESTING | Age: 58
Discharge: HOME | End: 2023-12-24
Payer: COMMERCIAL

## 2023-12-20 VITALS
DIASTOLIC BLOOD PRESSURE: 69 MMHG | TEMPERATURE: 97 F | WEIGHT: 184.2 LBS | HEART RATE: 73 BPM | RESPIRATION RATE: 16 BRPM | HEIGHT: 62 IN | OXYGEN SATURATION: 99 % | SYSTOLIC BLOOD PRESSURE: 151 MMHG | BODY MASS INDEX: 33.9 KG/M2

## 2023-12-20 DIAGNOSIS — M17.12 PRIMARY OSTEOARTHRITIS OF LEFT KNEE: Primary | ICD-10-CM

## 2023-12-20 DIAGNOSIS — Z01.818 PRE-OP TESTING: ICD-10-CM

## 2023-12-20 DIAGNOSIS — Z01.818 PRE-OP TESTING: Primary | ICD-10-CM

## 2023-12-20 LAB
ALBUMIN SERPL-MCNC: 5 GM/DL (ref 3.4–5)
ALP BLD-CCNC: 70 IU/L (ref 40–129)
ALT SERPL-CCNC: 13 U/L (ref 10–40)
ANION GAP SERPL CALCULATED.3IONS-SCNC: 12 MMOL/L (ref 4–16)
AST SERPL-CCNC: 18 IU/L (ref 15–37)
BACTERIA: NEGATIVE /HPF
BASOPHILS ABSOLUTE: 0 K/CU MM
BASOPHILS RELATIVE PERCENT: 0.8 % (ref 0–1)
BILIRUB SERPL-MCNC: 0.4 MG/DL (ref 0–1)
BILIRUBIN URINE: NEGATIVE MG/DL
BLOOD, URINE: NEGATIVE
BUN SERPL-MCNC: 14 MG/DL (ref 6–23)
CALCIUM SERPL-MCNC: 10.1 MG/DL (ref 8.3–10.6)
CHLORIDE BLD-SCNC: 106 MMOL/L (ref 99–110)
CLARITY: CLEAR
CO2: 27 MMOL/L (ref 21–32)
COLOR: YELLOW
CREAT SERPL-MCNC: 0.7 MG/DL (ref 0.6–1.1)
DIFFERENTIAL TYPE: ABNORMAL
EOSINOPHILS ABSOLUTE: 0.1 K/CU MM
EOSINOPHILS RELATIVE PERCENT: 2.3 % (ref 0–3)
ERYTHROCYTE SEDIMENTATION RATE: 7 MM/HR (ref 0–30)
GFR SERPL CREATININE-BSD FRML MDRD: >60 ML/MIN/1.73M2
GLUCOSE SERPL-MCNC: 96 MG/DL (ref 70–99)
GLUCOSE, URINE: NEGATIVE MG/DL
HCT VFR BLD CALC: 44.1 % (ref 37–47)
HEMOGLOBIN: 13.9 GM/DL (ref 12.5–16)
IMMATURE NEUTROPHIL %: 0 % (ref 0–0.43)
KETONES, URINE: NEGATIVE MG/DL
LEUKOCYTE ESTERASE, URINE: ABNORMAL
LYMPHOCYTES ABSOLUTE: 1.5 K/CU MM
LYMPHOCYTES RELATIVE PERCENT: 39.4 % (ref 24–44)
MCH RBC QN AUTO: 29.6 PG (ref 27–31)
MCHC RBC AUTO-ENTMCNC: 31.5 % (ref 32–36)
MCV RBC AUTO: 94 FL (ref 78–100)
MONOCYTES ABSOLUTE: 0.1 K/CU MM
MONOCYTES RELATIVE PERCENT: 3.6 % (ref 0–4)
MUCUS: ABNORMAL HPF
NITRITE URINE, QUANTITATIVE: NEGATIVE
NUCLEATED RBC %: 0 %
PDW BLD-RTO: 15.9 % (ref 11.7–14.9)
PH, URINE: 5.5 (ref 5–8)
PLATELET # BLD: 307 K/CU MM (ref 140–440)
PMV BLD AUTO: 9.9 FL (ref 7.5–11.1)
POTASSIUM SERPL-SCNC: 4.1 MMOL/L (ref 3.5–5.1)
PROTEIN UA: NEGATIVE MG/DL
RBC # BLD: 4.69 M/CU MM (ref 4.2–5.4)
RBC URINE: <1 /HPF (ref 0–6)
SEGMENTED NEUTROPHILS ABSOLUTE COUNT: 2.1 K/CU MM
SEGMENTED NEUTROPHILS RELATIVE PERCENT: 53.9 % (ref 36–66)
SODIUM BLD-SCNC: 145 MMOL/L (ref 135–145)
SPECIFIC GRAVITY UA: >1.03 (ref 1–1.03)
SQUAMOUS EPITHELIAL: 2 /HPF
TOTAL IMMATURE NEUTOROPHIL: 0 K/CU MM
TOTAL NUCLEATED RBC: 0 K/CU MM
TOTAL PROTEIN: 7.7 GM/DL (ref 6.4–8.2)
TRICHOMONAS: ABNORMAL /HPF
UROBILINOGEN, URINE: 0.2 MG/DL (ref 0.2–1)
WBC # BLD: 3.9 K/CU MM (ref 4–10.5)
WBC UA: 4 /HPF (ref 0–5)

## 2023-12-20 PROCEDURE — 81001 URINALYSIS AUTO W/SCOPE: CPT

## 2023-12-20 PROCEDURE — 87186 SC STD MICRODIL/AGAR DIL: CPT

## 2023-12-20 PROCEDURE — 87086 URINE CULTURE/COLONY COUNT: CPT

## 2023-12-20 PROCEDURE — 85652 RBC SED RATE AUTOMATED: CPT

## 2023-12-20 PROCEDURE — 87088 URINE BACTERIA CULTURE: CPT

## 2023-12-20 PROCEDURE — 93005 ELECTROCARDIOGRAM TRACING: CPT | Performed by: ORTHOPAEDIC SURGERY

## 2023-12-20 PROCEDURE — 85025 COMPLETE CBC W/AUTO DIFF WBC: CPT

## 2023-12-20 PROCEDURE — 80053 COMPREHEN METABOLIC PANEL: CPT

## 2023-12-20 PROCEDURE — 36415 COLL VENOUS BLD VENIPUNCTURE: CPT

## 2023-12-20 RX ORDER — SODIUM CHLORIDE, SODIUM LACTATE, POTASSIUM CHLORIDE, CALCIUM CHLORIDE 600; 310; 30; 20 MG/100ML; MG/100ML; MG/100ML; MG/100ML
INJECTION, SOLUTION INTRAVENOUS CONTINUOUS
OUTPATIENT
Start: 2024-01-04

## 2023-12-20 RX ORDER — M-VIT,TX,IRON,MINS/CALC/FOLIC 27MG-0.4MG
1 TABLET ORAL DAILY
COMMUNITY

## 2023-12-22 LAB
CULTURE: ABNORMAL
CULTURE: ABNORMAL
EKG ATRIAL RATE: 75 BPM
EKG DIAGNOSIS: NORMAL
EKG P AXIS: 22 DEGREES
EKG P-R INTERVAL: 164 MS
EKG Q-T INTERVAL: 380 MS
EKG QRS DURATION: 84 MS
EKG QTC CALCULATION (BAZETT): 424 MS
EKG R AXIS: 50 DEGREES
EKG T AXIS: 46 DEGREES
EKG VENTRICULAR RATE: 75 BPM
Lab: ABNORMAL
SPECIMEN: ABNORMAL

## 2023-12-22 PROCEDURE — 93010 ELECTROCARDIOGRAM REPORT: CPT | Performed by: INTERNAL MEDICINE

## 2024-01-03 ENCOUNTER — ANESTHESIA EVENT (OUTPATIENT)
Dept: OPERATING ROOM | Age: 59
End: 2024-01-03
Payer: COMMERCIAL

## 2024-01-04 ENCOUNTER — HOSPITAL ENCOUNTER (OUTPATIENT)
Age: 59
Discharge: HOME | End: 2024-01-05
Attending: ORTHOPAEDIC SURGERY | Admitting: ORTHOPAEDIC SURGERY
Payer: COMMERCIAL

## 2024-01-04 ENCOUNTER — ANESTHESIA (OUTPATIENT)
Dept: OPERATING ROOM | Age: 59
End: 2024-01-04
Payer: COMMERCIAL

## 2024-01-04 ENCOUNTER — APPOINTMENT (OUTPATIENT)
Dept: GENERAL RADIOLOGY | Age: 59
End: 2024-01-04
Attending: ORTHOPAEDIC SURGERY
Payer: COMMERCIAL

## 2024-01-04 DIAGNOSIS — Z96.651 HISTORY OF TOTAL RIGHT KNEE REPLACEMENT: Primary | ICD-10-CM

## 2024-01-04 LAB
GLUCOSE BLD-MCNC: 102 MG/DL (ref 70–99)
GLUCOSE BLD-MCNC: 114 MG/DL (ref 70–99)
GLUCOSE BLD-MCNC: 174 MG/DL (ref 70–99)
GLUCOSE BLD-MCNC: 198 MG/DL (ref 70–99)

## 2024-01-04 PROCEDURE — 2720000010 HC SURG SUPPLY STERILE: Performed by: ORTHOPAEDIC SURGERY

## 2024-01-04 PROCEDURE — 73560 X-RAY EXAM OF KNEE 1 OR 2: CPT

## 2024-01-04 PROCEDURE — 2500000003 HC RX 250 WO HCPCS: Performed by: ORTHOPAEDIC SURGERY

## 2024-01-04 PROCEDURE — 6360000002 HC RX W HCPCS: Performed by: STUDENT IN AN ORGANIZED HEALTH CARE EDUCATION/TRAINING PROGRAM

## 2024-01-04 PROCEDURE — 94761 N-INVAS EAR/PLS OXIMETRY MLT: CPT

## 2024-01-04 PROCEDURE — 2580000003 HC RX 258: Performed by: ORTHOPAEDIC SURGERY

## 2024-01-04 PROCEDURE — 3600000015 HC SURGERY LEVEL 5 ADDTL 15MIN: Performed by: ORTHOPAEDIC SURGERY

## 2024-01-04 PROCEDURE — 2709999900 HC NON-CHARGEABLE SUPPLY: Performed by: ORTHOPAEDIC SURGERY

## 2024-01-04 PROCEDURE — 2580000003 HC RX 258

## 2024-01-04 PROCEDURE — 6370000000 HC RX 637 (ALT 250 FOR IP): Performed by: STUDENT IN AN ORGANIZED HEALTH CARE EDUCATION/TRAINING PROGRAM

## 2024-01-04 PROCEDURE — A4217 STERILE WATER/SALINE, 500 ML: HCPCS | Performed by: ORTHOPAEDIC SURGERY

## 2024-01-04 PROCEDURE — 2580000003 HC RX 258: Performed by: STUDENT IN AN ORGANIZED HEALTH CARE EDUCATION/TRAINING PROGRAM

## 2024-01-04 PROCEDURE — 3700000001 HC ADD 15 MINUTES (ANESTHESIA): Performed by: ORTHOPAEDIC SURGERY

## 2024-01-04 PROCEDURE — 6360000002 HC RX W HCPCS

## 2024-01-04 PROCEDURE — 6360000002 HC RX W HCPCS: Performed by: ORTHOPAEDIC SURGERY

## 2024-01-04 PROCEDURE — C1776 JOINT DEVICE (IMPLANTABLE): HCPCS | Performed by: ORTHOPAEDIC SURGERY

## 2024-01-04 PROCEDURE — 6370000000 HC RX 637 (ALT 250 FOR IP): Performed by: ORTHOPAEDIC SURGERY

## 2024-01-04 PROCEDURE — 64447 NJX AA&/STRD FEMORAL NRV IMG: CPT | Performed by: ANESTHESIOLOGY

## 2024-01-04 PROCEDURE — 6360000002 HC RX W HCPCS: Performed by: ANESTHESIOLOGY

## 2024-01-04 PROCEDURE — 7100000001 HC PACU RECOVERY - ADDTL 15 MIN: Performed by: ORTHOPAEDIC SURGERY

## 2024-01-04 PROCEDURE — 82962 GLUCOSE BLOOD TEST: CPT

## 2024-01-04 PROCEDURE — 7100000000 HC PACU RECOVERY - FIRST 15 MIN: Performed by: ORTHOPAEDIC SURGERY

## 2024-01-04 PROCEDURE — C1713 ANCHOR/SCREW BN/BN,TIS/BN: HCPCS | Performed by: ORTHOPAEDIC SURGERY

## 2024-01-04 PROCEDURE — 3700000000 HC ANESTHESIA ATTENDED CARE: Performed by: ORTHOPAEDIC SURGERY

## 2024-01-04 PROCEDURE — 3600000005 HC SURGERY LEVEL 5 BASE: Performed by: ORTHOPAEDIC SURGERY

## 2024-01-04 RX ORDER — SODIUM CHLORIDE 0.9 % (FLUSH) 0.9 %
5-40 SYRINGE (ML) INJECTION EVERY 12 HOURS SCHEDULED
Status: DISCONTINUED | OUTPATIENT
Start: 2024-01-04 | End: 2024-01-04 | Stop reason: HOSPADM

## 2024-01-04 RX ORDER — BUPIVACAINE HYDROCHLORIDE 5 MG/ML
INJECTION, SOLUTION EPIDURAL; INTRACAUDAL
Status: COMPLETED | OUTPATIENT
Start: 2024-01-04 | End: 2024-01-04

## 2024-01-04 RX ORDER — OXYCODONE HYDROCHLORIDE 10 MG/1
10 TABLET ORAL EVERY 4 HOURS PRN
Status: DISCONTINUED | OUTPATIENT
Start: 2024-01-04 | End: 2024-01-05 | Stop reason: HOSPADM

## 2024-01-04 RX ORDER — HYDRALAZINE HYDROCHLORIDE 20 MG/ML
10 INJECTION INTRAMUSCULAR; INTRAVENOUS
Status: DISCONTINUED | OUTPATIENT
Start: 2024-01-04 | End: 2024-01-04 | Stop reason: HOSPADM

## 2024-01-04 RX ORDER — SODIUM CHLORIDE 0.9 % (FLUSH) 0.9 %
5-40 SYRINGE (ML) INJECTION PRN
Status: DISCONTINUED | OUTPATIENT
Start: 2024-01-04 | End: 2024-01-04 | Stop reason: HOSPADM

## 2024-01-04 RX ORDER — SODIUM CHLORIDE, SODIUM LACTATE, POTASSIUM CHLORIDE, CALCIUM CHLORIDE 600; 310; 30; 20 MG/100ML; MG/100ML; MG/100ML; MG/100ML
INJECTION, SOLUTION INTRAVENOUS CONTINUOUS
Status: DISCONTINUED | OUTPATIENT
Start: 2024-01-04 | End: 2024-01-05 | Stop reason: HOSPADM

## 2024-01-04 RX ORDER — SODIUM CHLORIDE 9 MG/ML
INJECTION, SOLUTION INTRAVENOUS PRN
Status: DISCONTINUED | OUTPATIENT
Start: 2024-01-04 | End: 2024-01-05 | Stop reason: HOSPADM

## 2024-01-04 RX ORDER — ASPIRIN 325 MG
325 TABLET ORAL 2 TIMES DAILY
Status: DISCONTINUED | OUTPATIENT
Start: 2024-01-04 | End: 2024-01-05 | Stop reason: HOSPADM

## 2024-01-04 RX ORDER — LOSARTAN POTASSIUM 100 MG/1
100 TABLET ORAL NIGHTLY
Status: DISCONTINUED | OUTPATIENT
Start: 2024-01-05 | End: 2024-01-05 | Stop reason: HOSPADM

## 2024-01-04 RX ORDER — LIDOCAINE HYDROCHLORIDE 10 MG/ML
INJECTION, SOLUTION EPIDURAL; INFILTRATION; INTRACAUDAL; PERINEURAL
Status: DISPENSED
Start: 2024-01-04 | End: 2024-01-04

## 2024-01-04 RX ORDER — M-VIT,TX,IRON,MINS/CALC/FOLIC 27MG-0.4MG
1 TABLET ORAL DAILY
Status: DISCONTINUED | OUTPATIENT
Start: 2024-01-04 | End: 2024-01-05 | Stop reason: HOSPADM

## 2024-01-04 RX ORDER — SODIUM CHLORIDE 0.9 % (FLUSH) 0.9 %
5-40 SYRINGE (ML) INJECTION PRN
Status: DISCONTINUED | OUTPATIENT
Start: 2024-01-04 | End: 2024-01-05 | Stop reason: HOSPADM

## 2024-01-04 RX ORDER — MIDAZOLAM HYDROCHLORIDE 1 MG/ML
INJECTION INTRAMUSCULAR; INTRAVENOUS
Status: COMPLETED
Start: 2024-01-04 | End: 2024-01-04

## 2024-01-04 RX ORDER — SODIUM CHLORIDE, SODIUM LACTATE, POTASSIUM CHLORIDE, CALCIUM CHLORIDE 600; 310; 30; 20 MG/100ML; MG/100ML; MG/100ML; MG/100ML
INJECTION, SOLUTION INTRAVENOUS CONTINUOUS
Status: DISCONTINUED | OUTPATIENT
Start: 2024-01-04 | End: 2024-01-04

## 2024-01-04 RX ORDER — GLUCAGON 1 MG/ML
1 KIT INJECTION PRN
Status: DISCONTINUED | OUTPATIENT
Start: 2024-01-04 | End: 2024-01-05 | Stop reason: HOSPADM

## 2024-01-04 RX ORDER — SODIUM CHLORIDE 9 MG/ML
INJECTION, SOLUTION INTRAVENOUS PRN
Status: DISCONTINUED | OUTPATIENT
Start: 2024-01-04 | End: 2024-01-04 | Stop reason: HOSPADM

## 2024-01-04 RX ORDER — CYCLOBENZAPRINE HCL 10 MG
10 TABLET ORAL EVERY 12 HOURS PRN
Status: DISCONTINUED | OUTPATIENT
Start: 2024-01-04 | End: 2024-01-05 | Stop reason: HOSPADM

## 2024-01-04 RX ORDER — KETOROLAC TROMETHAMINE 30 MG/ML
15 INJECTION, SOLUTION INTRAMUSCULAR; INTRAVENOUS EVERY 6 HOURS PRN
Status: DISCONTINUED | OUTPATIENT
Start: 2024-01-04 | End: 2024-01-05 | Stop reason: HOSPADM

## 2024-01-04 RX ORDER — ATORVASTATIN CALCIUM 10 MG/1
10 TABLET, FILM COATED ORAL NIGHTLY
Status: DISCONTINUED | OUTPATIENT
Start: 2024-01-04 | End: 2024-01-05 | Stop reason: HOSPADM

## 2024-01-04 RX ORDER — ONDANSETRON 2 MG/ML
INJECTION INTRAMUSCULAR; INTRAVENOUS PRN
Status: DISCONTINUED | OUTPATIENT
Start: 2024-01-04 | End: 2024-01-04 | Stop reason: SDUPTHER

## 2024-01-04 RX ORDER — TRANEXAMIC ACID 10 MG/ML
1000 INJECTION, SOLUTION INTRAVENOUS
Status: COMPLETED | OUTPATIENT
Start: 2024-01-04 | End: 2024-01-04

## 2024-01-04 RX ORDER — FENTANYL CITRATE 50 UG/ML
50 INJECTION, SOLUTION INTRAMUSCULAR; INTRAVENOUS EVERY 5 MIN PRN
Status: DISCONTINUED | OUTPATIENT
Start: 2024-01-04 | End: 2024-01-04 | Stop reason: HOSPADM

## 2024-01-04 RX ORDER — OXYCODONE HYDROCHLORIDE 5 MG/1
5 TABLET ORAL
Status: DISCONTINUED | OUTPATIENT
Start: 2024-01-04 | End: 2024-01-04 | Stop reason: HOSPADM

## 2024-01-04 RX ORDER — MEPERIDINE HYDROCHLORIDE 25 MG/ML
12.5 INJECTION INTRAMUSCULAR; INTRAVENOUS; SUBCUTANEOUS EVERY 5 MIN PRN
Status: DISCONTINUED | OUTPATIENT
Start: 2024-01-04 | End: 2024-01-04 | Stop reason: HOSPADM

## 2024-01-04 RX ORDER — DEXTROSE MONOHYDRATE 100 MG/ML
INJECTION, SOLUTION INTRAVENOUS CONTINUOUS PRN
Status: DISCONTINUED | OUTPATIENT
Start: 2024-01-04 | End: 2024-01-05 | Stop reason: HOSPADM

## 2024-01-04 RX ORDER — INSULIN LISPRO 100 [IU]/ML
0-4 INJECTION, SOLUTION INTRAVENOUS; SUBCUTANEOUS
Status: DISCONTINUED | OUTPATIENT
Start: 2024-01-04 | End: 2024-01-05 | Stop reason: HOSPADM

## 2024-01-04 RX ORDER — ROPIVACAINE HYDROCHLORIDE 5 MG/ML
INJECTION, SOLUTION EPIDURAL; INFILTRATION; PERINEURAL
Status: DISPENSED
Start: 2024-01-04 | End: 2024-01-04

## 2024-01-04 RX ORDER — INSULIN LISPRO 100 [IU]/ML
0-4 INJECTION, SOLUTION INTRAVENOUS; SUBCUTANEOUS NIGHTLY
Status: DISCONTINUED | OUTPATIENT
Start: 2024-01-04 | End: 2024-01-05 | Stop reason: HOSPADM

## 2024-01-04 RX ORDER — LIDOCAINE HYDROCHLORIDE 20 MG/ML
INJECTION, SOLUTION INTRAVENOUS PRN
Status: DISCONTINUED | OUTPATIENT
Start: 2024-01-04 | End: 2024-01-04 | Stop reason: SDUPTHER

## 2024-01-04 RX ORDER — PROPOFOL 10 MG/ML
INJECTION, EMULSION INTRAVENOUS CONTINUOUS PRN
Status: DISCONTINUED | OUTPATIENT
Start: 2024-01-04 | End: 2024-01-04 | Stop reason: SDUPTHER

## 2024-01-04 RX ORDER — ONDANSETRON 2 MG/ML
4 INJECTION INTRAMUSCULAR; INTRAVENOUS
Status: DISCONTINUED | OUTPATIENT
Start: 2024-01-04 | End: 2024-01-04 | Stop reason: HOSPADM

## 2024-01-04 RX ORDER — ACETAMINOPHEN 500 MG
1000 TABLET ORAL ONCE
Status: COMPLETED | OUTPATIENT
Start: 2024-01-04 | End: 2024-01-04

## 2024-01-04 RX ORDER — ONDANSETRON 2 MG/ML
4 INJECTION INTRAMUSCULAR; INTRAVENOUS EVERY 6 HOURS PRN
Status: DISCONTINUED | OUTPATIENT
Start: 2024-01-04 | End: 2024-01-05 | Stop reason: HOSPADM

## 2024-01-04 RX ORDER — SODIUM CHLORIDE 0.9 % (FLUSH) 0.9 %
5-40 SYRINGE (ML) INJECTION EVERY 12 HOURS SCHEDULED
Status: DISCONTINUED | OUTPATIENT
Start: 2024-01-04 | End: 2024-01-05 | Stop reason: HOSPADM

## 2024-01-04 RX ORDER — OXYCODONE HYDROCHLORIDE 5 MG/1
5 TABLET ORAL EVERY 4 HOURS PRN
Status: DISCONTINUED | OUTPATIENT
Start: 2024-01-04 | End: 2024-01-05 | Stop reason: HOSPADM

## 2024-01-04 RX ORDER — DROPERIDOL 2.5 MG/ML
0.62 INJECTION, SOLUTION INTRAMUSCULAR; INTRAVENOUS
Status: DISCONTINUED | OUTPATIENT
Start: 2024-01-04 | End: 2024-01-04 | Stop reason: HOSPADM

## 2024-01-04 RX ORDER — ACETAMINOPHEN 325 MG/1
650 TABLET ORAL EVERY 6 HOURS
Status: DISCONTINUED | OUTPATIENT
Start: 2024-01-04 | End: 2024-01-05 | Stop reason: HOSPADM

## 2024-01-04 RX ORDER — LABETALOL HYDROCHLORIDE 5 MG/ML
10 INJECTION, SOLUTION INTRAVENOUS
Status: DISCONTINUED | OUTPATIENT
Start: 2024-01-04 | End: 2024-01-04 | Stop reason: HOSPADM

## 2024-01-04 RX ORDER — SODIUM CHLORIDE, SODIUM LACTATE, POTASSIUM CHLORIDE, CALCIUM CHLORIDE 600; 310; 30; 20 MG/100ML; MG/100ML; MG/100ML; MG/100ML
INJECTION, SOLUTION INTRAVENOUS CONTINUOUS
Status: DISCONTINUED | OUTPATIENT
Start: 2024-01-04 | End: 2024-01-04 | Stop reason: HOSPADM

## 2024-01-04 RX ORDER — MIDAZOLAM HYDROCHLORIDE 1 MG/ML
INJECTION INTRAMUSCULAR; INTRAVENOUS
Status: COMPLETED | OUTPATIENT
Start: 2024-01-04 | End: 2024-01-04

## 2024-01-04 RX ADMIN — SODIUM CHLORIDE, POTASSIUM CHLORIDE, SODIUM LACTATE AND CALCIUM CHLORIDE: 600; 310; 30; 20 INJECTION, SOLUTION INTRAVENOUS at 09:23

## 2024-01-04 RX ADMIN — TRANEXAMIC ACID 1000 MG: 10 INJECTION, SOLUTION INTRAVENOUS at 11:33

## 2024-01-04 RX ADMIN — LIDOCAINE HYDROCHLORIDE 40 MG: 20 INJECTION, SOLUTION INTRAVENOUS at 11:28

## 2024-01-04 RX ADMIN — BUPIVACAINE HYDROCHLORIDE 15 MG: 5 INJECTION EPIDURAL; INTRACAUDAL; PERINEURAL at 11:25

## 2024-01-04 RX ADMIN — PHENYLEPHRINE HYDROCHLORIDE 20 MCG/MIN: 10 INJECTION INTRAVENOUS at 11:31

## 2024-01-04 RX ADMIN — SODIUM CHLORIDE, POTASSIUM CHLORIDE, SODIUM LACTATE AND CALCIUM CHLORIDE: 600; 310; 30; 20 INJECTION, SOLUTION INTRAVENOUS at 16:55

## 2024-01-04 RX ADMIN — OXYCODONE HYDROCHLORIDE 10 MG: 10 TABLET ORAL at 16:05

## 2024-01-04 RX ADMIN — PHENYLEPHRINE HYDROCHLORIDE 100 MCG: 10 INJECTION INTRAVENOUS at 11:31

## 2024-01-04 RX ADMIN — CEFAZOLIN 2000 MG: 2 INJECTION, POWDER, FOR SOLUTION INTRAMUSCULAR; INTRAVENOUS at 11:28

## 2024-01-04 RX ADMIN — PROPOFOL 120 MCG/KG/MIN: 10 INJECTION, EMULSION INTRAVENOUS at 11:28

## 2024-01-04 RX ADMIN — ACETAMINOPHEN 650 MG: 325 TABLET ORAL at 19:47

## 2024-01-04 RX ADMIN — HYDROMORPHONE HYDROCHLORIDE 0.25 MG: 1 INJECTION, SOLUTION INTRAMUSCULAR; INTRAVENOUS; SUBCUTANEOUS at 16:52

## 2024-01-04 RX ADMIN — CYCLOBENZAPRINE 10 MG: 10 TABLET, FILM COATED ORAL at 19:55

## 2024-01-04 RX ADMIN — SODIUM CHLORIDE, SODIUM LACTATE, POTASSIUM CHLORIDE, CALCIUM CHLORIDE: 600; 310; 30; 20 INJECTION, SOLUTION INTRAVENOUS at 13:35

## 2024-01-04 RX ADMIN — ONDANSETRON 4 MG: 2 INJECTION INTRAMUSCULAR; INTRAVENOUS at 11:28

## 2024-01-04 RX ADMIN — ACETAMINOPHEN 1000 MG: 500 TABLET ORAL at 09:16

## 2024-01-04 RX ADMIN — ONDANSETRON 4 MG: 2 INJECTION INTRAMUSCULAR; INTRAVENOUS at 17:45

## 2024-01-04 RX ADMIN — KETOROLAC TROMETHAMINE 15 MG: 30 INJECTION, SOLUTION INTRAMUSCULAR; INTRAVENOUS at 16:43

## 2024-01-04 RX ADMIN — ASPIRIN 325 MG: 325 TABLET ORAL at 19:47

## 2024-01-04 RX ADMIN — MIDAZOLAM 2 MG: 1 INJECTION INTRAMUSCULAR; INTRAVENOUS at 10:01

## 2024-01-04 RX ADMIN — SODIUM CHLORIDE, PRESERVATIVE FREE 10 ML: 5 INJECTION INTRAVENOUS at 19:48

## 2024-01-04 RX ADMIN — CEFAZOLIN 2000 MG: 2 INJECTION, POWDER, FOR SOLUTION INTRAMUSCULAR; INTRAVENOUS at 19:53

## 2024-01-04 RX ADMIN — MEPERIDINE HYDROCHLORIDE 12.5 MG: 25 INJECTION, SOLUTION INTRAMUSCULAR; INTRAVENOUS; SUBCUTANEOUS at 13:19

## 2024-01-04 RX ADMIN — ATORVASTATIN CALCIUM 10 MG: 10 TABLET, FILM COATED ORAL at 19:47

## 2024-01-04 ASSESSMENT — PAIN - FUNCTIONAL ASSESSMENT
PAIN_FUNCTIONAL_ASSESSMENT: ACTIVITIES ARE NOT PREVENTED
PAIN_FUNCTIONAL_ASSESSMENT: PREVENTS OR INTERFERES SOME ACTIVE ACTIVITIES AND ADLS
PAIN_FUNCTIONAL_ASSESSMENT: 0-10

## 2024-01-04 ASSESSMENT — PAIN DESCRIPTION - DESCRIPTORS
DESCRIPTORS: SORE;ACHING;DISCOMFORT
DESCRIPTORS: STABBING;THROBBING
DESCRIPTORS: ACHING;THROBBING
DESCRIPTORS: THROBBING;ACHING

## 2024-01-04 ASSESSMENT — PAIN SCALES - GENERAL
PAINLEVEL_OUTOF10: 10
PAINLEVEL_OUTOF10: 0
PAINLEVEL_OUTOF10: 8
PAINLEVEL_OUTOF10: 6
PAINLEVEL_OUTOF10: 6
PAINLEVEL_OUTOF10: 8
PAINLEVEL_OUTOF10: 9

## 2024-01-04 ASSESSMENT — PAIN DESCRIPTION - LOCATION
LOCATION: KNEE

## 2024-01-04 ASSESSMENT — PAIN SCALES - WONG BAKER
WONGBAKER_NUMERICALRESPONSE: 2
WONGBAKER_NUMERICALRESPONSE: 2

## 2024-01-04 ASSESSMENT — PAIN DESCRIPTION - ORIENTATION
ORIENTATION: RIGHT

## 2024-01-04 ASSESSMENT — PAIN DESCRIPTION - PAIN TYPE: TYPE: SURGICAL PAIN

## 2024-01-04 NOTE — ANESTHESIA PROCEDURE NOTES
Peripheral Block    Patient location during procedure: PACU  Reason for block: post-op pain management and at surgeon's request  Start time: 1/4/2024 10:01 AM  End time: 1/4/2024 10:06 AM  Staffing  Performed: anesthesiologist   Anesthesiologist: Henry Pastor MD  Performed by: Henry Pastor MD  Authorized by: Henry Pastor MD    Preanesthetic Checklist  Completed: patient identified, IV checked, site marked, risks and benefits discussed, surgical/procedural consents, equipment checked, pre-op evaluation, timeout performed, anesthesia consent given, oxygen available, monitors applied/VS acknowledged, fire risk safety assessment completed and verbalized and blood product R/B/A discussed and consented  Peripheral Block   Patient position: supine  Prep: ChloraPrep  Provider prep: mask and sterile gloves  Patient monitoring: cardiac monitor, continuous pulse ox, frequent blood pressure checks, IV access, oxygen and responsive to questions  Block type: Femoral  Adductor canal  Laterality: right  Injection technique: single-shot  Guidance: ultrasound guided  Local infiltration: ropivacaine  Infiltration strength: 0.5 %  Local infiltration: ropivacaine  Dose: 20 mL    Needle   Needle type: insulated echogenic nerve stimulator needle   Needle gauge: 22 G  Needle localization: ultrasound guidance  Needle length: 8 cm  Assessment   Injection assessment: negative aspiration for heme, no paresthesia on injection, local visualized surrounding nerve on ultrasound and no intravascular symptoms  Paresthesia pain: none  Slow fractionated injection: yes  Hemodynamics: stable  Real-time US image taken/store: yes  Outcomes: uncomplicated and patient tolerated procedure well    Medications Administered  midazolam (VERSED) injection 2 mg/2mL - IntraVENous   2 mg - 1/4/2024 10:01:00 AM

## 2024-01-04 NOTE — BRIEF OP NOTE
Brief Postoperative Note      Patient: Martha Burns  YOB: 1965  MRN: [id number]    Date of Procedure: 1/4/2024    Pre-Op Diagnosis Codes:     * Primary osteoarthritis of right knee [M17.11]     * Chronic pain of right knee [M25.561, G89.29]    Post-Op Diagnosis: Same       Procedure(s):  RIGHT KNEE TOTAL ARTHROPLASTY    Surgeon(s):  Amilcar Holbrook DO    Assistant:  * No surgical staff found *    Anesthesia: Spinal    Estimated Blood Loss (mL): 150 mL    Complications: None    Specimens:   * No specimens in log *    Implants:  Implant Name Type Inv. Item Serial No.  Lot No. LRB No. Used Action   CEMENT BNE 40GM W/ GENT HI VISC RADPQ FOR REV SURG - [id number]  CEMENT BNE 40GM W/ GENT HI VISC RADPQ FOR REV SURG  [employer] [id number] Right 2 Implanted   DUP USE [id number] IMPL KNEE PSN TIB STM 5 DEG SZ D R - [id number] Knee DUP USE [id number] IMPL KNEE PSN TIB STM 5 DEG SZ D R  [employer] [id number] Right 1 Implanted   COMPONENT PAT [id number] THK8.5MM KNEE POLY DIANA CONVENTIONAL - [id number]  COMPONENT PAT [id number] THK8.5MM KNEE POLY DIANA CONVENTIONAL  [employer] [id number] Right 1 Implanted   IMPL KNEE PSN FEM CR CMT CCR STD SZ5 R - [id number] Knee IMPL KNEE PSN FEM CR CMT CCR STD SZ5 R  [employer] [id number] Right 1 Implanted   PSN MC VE ASF R 11MM 4-5/CD - [id number]  PSN MC VE ASF R 11MM 4-5/CD  [employer] [id number] Right 1 Implanted         Drains: * No LDAs found *    Findings: R knee OA      Electronically signed by MAILCAR HOLBROOK DO on 1/4/2024 at 12:28 PM

## 2024-01-04 NOTE — ANESTHESIA POSTPROCEDURE EVALUATION
Department of Anesthesiology  Postprocedure Note    Patient: Martha Burns  MRN: [id number]  YOB: 1965  Date of evaluation: 1/4/2024    Procedure Summary       Date: 01/04/24 Room / Location: [address]    Anesthesia Start: 1104 Anesthesia Stop: 1303    Procedure: RIGHT KNEE TOTAL ARTHROPLASTY (Right) Diagnosis:       Primary osteoarthritis of right knee      Chronic pain of right knee      (Primary osteoarthritis of right knee [M17.11])      (Chronic pain of right knee [M25.561, G89.29])    Surgeons: Amilcar Esteves DO Responsible Provider: Henry Pastor MD    Anesthesia Type: MAC, Spinal, Regional ASA Status: 3            Anesthesia Type: MAC, Spinal, Regional    Abbey Phase I: Abbey Score: 10    Abbey Phase II:      Anesthesia Post Evaluation    Patient location during evaluation: PACU  Patient participation: complete - patient participated  Level of consciousness: awake and alert  Airway patency: patent  Nausea & Vomiting: no nausea and no vomiting  Cardiovascular status: hemodynamically stable  Respiratory status: spontaneous ventilation and nasal cannula  Hydration status: stable  Pain management: adequate    No notable events documented.

## 2024-01-04 NOTE — ANESTHESIA PRE PROCEDURE
Department of Anesthesiology  Preprocedure Note       Name:  Martha Burns   Age:  58 y.o.  :  1965                                          MRN:  [id number]         Date:  2024      Surgeon: Surgeon(s):  Amilcar Esteves DO    Procedure: Procedure(s):  RIGHT KNEE TOTAL ARTHROPLASTY    Medications prior to admission:   Prior to Admission medications    Medication Sig Start Date End Date Taking? Authorizing Provider   Multiple Vitamins-Minerals (THERAPEUTIC MULTIVITAMIN-MINERALS) tablet Take 1 tablet by mouth daily    Mariusz Gonzalez MD   Multiple Vitamins-Minerals (HAIR SKIN & NAILS ADVANCED PO) Take by mouth daily    Mariusz Gonzalez MD   Probiotic Product (PROBIOTIC ADVANCED PO) Take by mouth    Mariusz Gonzalez MD   EPINEPHrine (EPIPEN) 0.3 MG/0.3ML SOAJ injection  23   Mariusz Gonzalez MD   loratadine (CLARITIN) 10 MG tablet  23   Mariusz Gonzalez MD   aspirin 81 MG chewable tablet Take 1 tablet by mouth nightly    Mariusz Gonzalez MD   Continuous Blood Gluc Sensor (DEXCOM G6 SENSOR) MISC  23   Mariusz Gonzalez MD   TRULICITY 3 MG/0.5ML SOPN once a week Takes on 23   Mariusz Gonzalez MD   glipiZIDE (GLUCOTROL XL) 5 MG extended release tablet nightly 23   Mariusz Gonzalez MD   losartan (COZAAR) 100 MG tablet nightly 23   Mariusz Gonzalez MD   omeprazole (PRILOSEC) 40 MG delayed release capsule  23   Mariusz Gonzalez MD   pioglitazone (ACTOS) 30 MG tablet nightly 23   Mariusz Gonzalez MD   simvastatin (ZOCOR) 10 MG tablet  23   Mariusz Gonzalez MD   lidocaine (LIDODERM) 5 % Place 1 patch onto the skin daily 12 hours on, 12 hours off. 18   Yary Khan PA-C   methocarbamol (ROBAXIN) 500 MG tablet Take 1 tablet by mouth 3 times daily As needed for muscle spasm. 18   Yary Khan PA-C   naproxen (NAPROSYN) 500 MG tablet Take 1 tablet by mouth 2 times daily as 
M25.511, M25.512    SOB (shortness of breath) R06.02    Obesity (BMI 30-39.9) E66.9    Arthritis of knee, right M17.11    Arthritis of knee, left M17.12       Past Medical History:        Diagnosis Date    Diabetes mellitus (HCC)     Type II - follows with PCP    History of nuclear stress test 06/23/2016    cardiolite-normal,EF65%    Hx of echocardiogram 06/23/2016    EF >55%. Normal chamber sizes. LVH with normal LVSF. Mild MR/TR. Normal sized abdominal aorta at 2.1cm.    Hyperlipidemia     Hypertension     Obesity (BMI 30-39.9)     Shoulder pain, bilateral     SOB (shortness of breath)        Past Surgical History:        Procedure Laterality Date    COLONOSCOPY  2021    Normal exam per pt    FOOT ARTHROPLASTY Bilateral     \"relieve pressure from toes\"    HYSTERECTOMY (CERVIX STATUS UNKNOWN)  2009       Social History:    Social History     Tobacco Use    Smoking status: Never    Smokeless tobacco: Never   Substance Use Topics    Alcohol use: No     Alcohol/week: 0.0 standard drinks of alcohol                                Counseling given: Not Answered      Vital Signs (Current): There were no vitals filed for this visit.                                           BP Readings from Last 3 Encounters:   12/20/23 (!) 151/69   11/20/23 (!) 145/98   05/25/23 (!) 162/94       NPO Status:                                                                                 BMI:   Wt Readings from Last 3 Encounters:   12/20/23 83.6 kg (184 lb 3.2 oz)   08/28/23 86.2 kg (190 lb)   05/25/23 86.2 kg (190 lb)     There is no height or weight on file to calculate BMI.    CBC:   Lab Results   Component Value Date/Time    WBC 3.9 12/20/2023 10:58 AM    RBC 4.69 12/20/2023 10:58 AM    HGB 13.9 12/20/2023 10:58 AM    HCT 44.1 12/20/2023 10:58 AM    MCV 94.0 12/20/2023 10:58 AM    RDW 15.9 12/20/2023 10:58 AM     12/20/2023 10:58 AM       CMP:   Lab Results   Component Value Date/Time     12/20/2023 10:58 AM    K 4.1

## 2024-01-04 NOTE — OP NOTE
DATE OF PROCEDURE:  1/4/2024    PREOPERATIVE DIAGNOSIS:  Right knee DJD.    POSTOPERATIVE DIAGNOSIS:  Right knee DJD.    PROCEDURE:  Right total knee arthroplasty using [employer]  MC knee with size 5 femoral component, size D tibial  component, size 32 patellar component and size 11 tibial poly component  with antibiotic-impregnated cement.    SURGEON:  Amilcar Esteves DO    ANESTHESIA:  Spinal with regional block.    ESTIMATED BLOOD LOSS:  150 mL.    TOTAL TOURNIQUET TIME:  33 minutes.    FLUIDS:  800 mL of crystalloids.    INDICATIONS FOR PROCEDURE:  The patient is a 58-year-old female with  long-standing history of right knee pain.  For this, she underwent  conservative treatment with no relief of her symptoms.  X-rays revealed  severe arthritis in the right knee.  Given her persistent symptoms  despite conservative treatment and with her x-ray findings, I  recommended surgical treatment.  I explained the risks, benefits and  possible complications of the procedure to the patient and after  answering all of her questions, she consented to undergo the above  procedure.    REPORT OF PROCEDURE:  The patient was seen and evaluated in the  preoperative holding area where the right lower extremity was signed in  her presence.  At this point, care of the patient was turned over to  anesthesia team who performed a regional block to the right lower  extremity.  She was then transported back to the operative suite.  Spinal  anesthesia was performed and once adequate anesthesia was obtained, the  right lower extremity was prepped and draped in usual sterile fashion.   Preoperative antibiotics were administered, 1 gm of TXA was administered  IV.  At this point, a time-out was performed and all in attendance were  in agreement.    I exsanguinated the right lower extremity with the use of an Esmarch and  tourniquet was inflated to 250 mmHg.  I then made a standard anterior  midline incision overlying the right

## 2024-01-05 VITALS
HEART RATE: 87 BPM | SYSTOLIC BLOOD PRESSURE: 167 MMHG | RESPIRATION RATE: 20 BRPM | WEIGHT: 184 LBS | HEIGHT: 62 IN | TEMPERATURE: 99 F | DIASTOLIC BLOOD PRESSURE: 73 MMHG | BODY MASS INDEX: 33.86 KG/M2 | OXYGEN SATURATION: 98 %

## 2024-01-05 LAB
GLUCOSE BLD-MCNC: 165 MG/DL (ref 70–99)
GLUCOSE BLD-MCNC: 191 MG/DL (ref 70–99)
HCT VFR BLD CALC: 37.9 % (ref 37–47)
HEMOGLOBIN: 12.1 GM/DL (ref 12.5–16)

## 2024-01-05 PROCEDURE — 82962 GLUCOSE BLOOD TEST: CPT

## 2024-01-05 PROCEDURE — 6360000002 HC RX W HCPCS: Performed by: ORTHOPAEDIC SURGERY

## 2024-01-05 PROCEDURE — 36415 COLL VENOUS BLD VENIPUNCTURE: CPT

## 2024-01-05 PROCEDURE — 97116 GAIT TRAINING THERAPY: CPT

## 2024-01-05 PROCEDURE — 85014 HEMATOCRIT: CPT

## 2024-01-05 PROCEDURE — 94761 N-INVAS EAR/PLS OXIMETRY MLT: CPT

## 2024-01-05 PROCEDURE — 6370000000 HC RX 637 (ALT 250 FOR IP): Performed by: STUDENT IN AN ORGANIZED HEALTH CARE EDUCATION/TRAINING PROGRAM

## 2024-01-05 PROCEDURE — 2580000003 HC RX 258: Performed by: ORTHOPAEDIC SURGERY

## 2024-01-05 PROCEDURE — 85018 HEMOGLOBIN: CPT

## 2024-01-05 PROCEDURE — 97162 PT EVAL MOD COMPLEX 30 MIN: CPT

## 2024-01-05 PROCEDURE — 6370000000 HC RX 637 (ALT 250 FOR IP): Performed by: ORTHOPAEDIC SURGERY

## 2024-01-05 PROCEDURE — 2580000003 HC RX 258: Performed by: STUDENT IN AN ORGANIZED HEALTH CARE EDUCATION/TRAINING PROGRAM

## 2024-01-05 RX ORDER — OXYCODONE HYDROCHLORIDE 5 MG/1
5 TABLET ORAL EVERY 6 HOURS PRN
Qty: 28 TABLET | Refills: 0 | Status: SHIPPED | OUTPATIENT
Start: 2024-01-05 | End: 2024-01-12

## 2024-01-05 RX ORDER — CYCLOBENZAPRINE HCL 10 MG
10 TABLET ORAL 3 TIMES DAILY PRN
Qty: 30 TABLET | Refills: 0 | Status: SHIPPED | OUTPATIENT
Start: 2024-01-05 | End: 2024-01-15

## 2024-01-05 RX ORDER — ASPIRIN 325 MG
325 TABLET ORAL 2 TIMES DAILY
Qty: 28 TABLET | Refills: 0 | Status: SHIPPED | OUTPATIENT
Start: 2024-01-05 | End: 2024-01-19

## 2024-01-05 RX ADMIN — OXYCODONE HYDROCHLORIDE 10 MG: 10 TABLET ORAL at 00:29

## 2024-01-05 RX ADMIN — OXYCODONE HYDROCHLORIDE 10 MG: 10 TABLET ORAL at 08:53

## 2024-01-05 RX ADMIN — ASPIRIN 325 MG: 325 TABLET ORAL at 08:15

## 2024-01-05 RX ADMIN — CEFAZOLIN 2000 MG: 2 INJECTION, POWDER, FOR SOLUTION INTRAMUSCULAR; INTRAVENOUS at 04:54

## 2024-01-05 RX ADMIN — SODIUM CHLORIDE, POTASSIUM CHLORIDE, SODIUM LACTATE AND CALCIUM CHLORIDE: 600; 310; 30; 20 INJECTION, SOLUTION INTRAVENOUS at 00:26

## 2024-01-05 RX ADMIN — ACETAMINOPHEN 650 MG: 325 TABLET ORAL at 08:18

## 2024-01-05 RX ADMIN — OXYCODONE HYDROCHLORIDE 10 MG: 10 TABLET ORAL at 04:50

## 2024-01-05 RX ADMIN — CYCLOBENZAPRINE 10 MG: 10 TABLET, FILM COATED ORAL at 08:15

## 2024-01-05 RX ADMIN — LOSARTAN POTASSIUM 100 MG: 100 TABLET, FILM COATED ORAL at 00:29

## 2024-01-05 RX ADMIN — ACETAMINOPHEN 650 MG: 325 TABLET ORAL at 04:51

## 2024-01-05 RX ADMIN — Medication 1 TABLET: at 08:15

## 2024-01-05 RX ADMIN — OXYCODONE HYDROCHLORIDE 10 MG: 10 TABLET ORAL at 13:04

## 2024-01-05 ASSESSMENT — PAIN - FUNCTIONAL ASSESSMENT
PAIN_FUNCTIONAL_ASSESSMENT: PREVENTS OR INTERFERES SOME ACTIVE ACTIVITIES AND ADLS
PAIN_FUNCTIONAL_ASSESSMENT: PREVENTS OR INTERFERES SOME ACTIVE ACTIVITIES AND ADLS

## 2024-01-05 ASSESSMENT — PAIN SCALES - GENERAL
PAINLEVEL_OUTOF10: 10
PAINLEVEL_OUTOF10: 10
PAINLEVEL_OUTOF10: 8
PAINLEVEL_OUTOF10: 6
PAINLEVEL_OUTOF10: 7
PAINLEVEL_OUTOF10: 7
PAINLEVEL_OUTOF10: 5

## 2024-01-05 ASSESSMENT — PAIN DESCRIPTION - ORIENTATION
ORIENTATION: RIGHT

## 2024-01-05 ASSESSMENT — PAIN DESCRIPTION - DESCRIPTORS
DESCRIPTORS: ACHING;SORE
DESCRIPTORS: ACHING;DISCOMFORT
DESCRIPTORS: ACHING;SORE
DESCRIPTORS: ACHING;SORE
DESCRIPTORS: THROBBING

## 2024-01-05 ASSESSMENT — PAIN DESCRIPTION - LOCATION
LOCATION: KNEE
LOCATION: KNEE
LOCATION: KNEE;LEG
LOCATION: KNEE
LOCATION: KNEE

## 2024-01-05 ASSESSMENT — PAIN DESCRIPTION - PAIN TYPE: TYPE: SURGICAL PAIN

## 2024-01-05 ASSESSMENT — PAIN DESCRIPTION - FREQUENCY: FREQUENCY: CONTINUOUS

## 2024-01-05 NOTE — CARE COORDINATION
01/05/24 0836   Service Assessment   Patient Orientation Alert and Oriented;Person;Place;Situation   Cognition Alert   History Provided By Patient;Medical Record   Primary Caregiver Self   Accompanied By/Relationship not applicable   Support Systems Parent;Children   Patient's Healthcare Decision Maker is: Legal Next of Kin   PCP Verified by CM Yes   Last Visit to PCP   (unknown)   Prior Functional Level Independent in ADLs/IADLs   Current Functional Level Assistance with the following:;Mobility;Bathing   Can patient return to prior living arrangement Yes   Ability to make needs known: Good   Would you like for me to discuss the discharge plan with any other family members/significant others, and if so, who? No   Financial Resources Medicaid   [facility] Resources None   CM/SW Referral Other (see comment)  (discharge planning assessment)     CM reviewed chart and met with pt at bedside. Pt has PCP/Ins. CM notes that pt lives in an apartment that does not have any steps. Pt states she has the help of her boyfriend, her mother, and her 20 year old dtr. Pt is a . Pt states she lives in [place] and plans to utilize HHC. Pt was given a list of HHC choices to choose from. Pt reports she would like to go with [facility], however, ins does not cover. Cm notes pt's choice with [facility]. Referral sent.

## 2024-01-05 NOTE — DISCHARGE INSTRUCTIONS
She is to have physical therapy for gait training and range of motion, and can be weightbearing as tolerated on the right leg.    Her incision is to be kept clean, dry, and intact at all times.    She is to ice and elevate the right lower extremity for pain and swelling.    She is to contact my office if she develops increased pain, swelling, numbness, tingling, redness, fevers, or chills.    She is to follow up in my office in 3 weeks at her prescheduled appointment.

## 2024-01-05 NOTE — CARE COORDINATION
Martha Burns was evaluated today and a DME order was entered for a wheeled walker because she requires this to successfully complete daily living tasks of eating, bathing, toileting, personal cares, ambulating, grooming, hygiene, dressing upper body, dressing lower body, meal preparation, and taking own medications.  A wheeled walker is necessary due to the patient's unsteady gait, upper body weakness, and inability to  an ambulation device; and she can ambulate only by pushing a walker instead of a lesser assistive device such as a cane, crutch, or standard walker.  The need for this equipment was discussed with the patient and she understands and is in agreement.

## 2024-01-05 NOTE — DISCHARGE SUMMARY
ADMIT DATE: 1/4/2024    DISCHARGE DATE: 1/5/2024    PROVIDER:     Amilcar Holbrook DO                      ADMISSION DIAGNOSIS:  Right knee DJD.     DISCHARGE DIAGNOSIS:  Right knee DJD.     PROCEDURE:  Right total knee arthroplasty on  1/4/2024.      HOSPITAL COURSE:  The patient is a 58 year-old female with a  longstanding history of right knee pain.  For this, she was brought to  [facility] on 1/4/2024 where she underwent right   total knee arthroplasty.  She was admitted postoperatively  for pain control, rehabilitation, and medical monitoring.  Hospitalist  was consulted for medical management.  Physical Therapy was consulted  for gait training.  She did receive antibiotic prophylaxis and DVT  prophylaxis during her hospitalization.  Throughout her hospital  course, clinically, she remained stable with no apparent medical  complications.  She was progressing well with physical therapy and her  pain was well controlled with oral medications.   was  consulted for discharge planning.  Given that clinically she was  stable, she was discharged to home on 1/5/2024     DISCHARGE MEDICATIONS:    1.  Oxycodone 5 mg one p.o. q.6 h. p.r.n. Pain.  2.   mg p.o. b.i.d. for 14 days  3.  Flexeril 10 mg 1 po q 8 hours PRN  4.  Other medications per the MAR.     DISCHARGE INSTRUCTIONS:    She is to have physical therapy for gait training and range of motion, and can be weightbearing as tolerated on the right leg.    Her incision is to be kept clean, dry, and intact at all times.    She is to ice and elevate the right lower extremity for pain and swelling.    She is to contact my office if she develops increased pain, swelling, numbness, tingling, redness, fevers, or chills.    She is to follow up in my office in 3 weeks at her prescheduled appointment.           AMILCAR HOLBROOK DO

## 2024-01-05 NOTE — PROGRESS NOTES
1300 Pt arrived from OR to PACU. Monitors applied and alarms in place. Report rec'd from MARCY CRNA. Pt endorses being able to feel touch at mid thigh. Pt unable to wiggle toes at this time.   1305   1310 pt denies pain or nausea  1319 PRN demerol given for shivering   1330 daughter shiloh updated on inpatient room  1345 Pt able to feel touch under knee. Unable to move toes at this time  1350 Report given to marga RN  1410 transport at bedside    
4 Eyes Skin Assessment     NAME:  Martha Burns  YOB: 1965  MEDICAL RECORD NUMBER:  [id number]    The patient is being assessed for  Admission    I agree that at least one RN has performed a thorough Head to Toe Skin Assessment on the patient. ALL assessment sites listed below have been assessed.      Areas assessed by both nurses:    Head, Face, Ears, Shoulders, Back, Chest, Arms, Elbows, Hands, Sacrum. Buttock, Coccyx, Ischium, and Legs. Feet and Heels        Does the Patient have a Wound? No noted wound(s)       [facility] Prevention initiated by RN: Yes  Wound Care Orders initiated by RN: No    Pressure Injury (Stage 3,4, Unstageable, DTI, NWPT, and Complex wounds) if present, place Wound referral order by RN under : No    New Ostomies, if present place, Ostomy referral order under : No     Nurse 1 eSignature: Electronically signed by Brynn Merino RN on 1/4/24 at 5:15 PM EST    **SHARE this note so that the co-signing nurse can place an eSignature**    Nurse 2 eSignature: Electronically signed by Brynn Banuelos LPN on 1/4/24 at 5:54 PM EST   
Martha Burns is a 58 y.o. female patient.  No diagnosis found.  Past Medical History:   Diagnosis Date    Diabetes mellitus (HCC)     Type II - follows with PCP    History of nuclear stress test 06/23/2016    cardiolite-normal,EF65%    Hx of echocardiogram 06/23/2016    EF >55%. Normal chamber sizes. LVH with normal LVSF. Mild MR/TR. Normal sized abdominal aorta at 2.1cm.    Hyperlipidemia     Hypertension     Obesity (BMI 30-39.9)     Shoulder pain, bilateral     SOB (shortness of breath)      No past surgical history pertinent negatives on file.  Scheduled Meds:   sodium chloride flush  5-40 mL IntraVENous 2 times per day    acetaminophen  650 mg Oral Q6H    aspirin  325 mg Oral BID    insulin lispro  0-4 Units SubCUTAneous TID WC    insulin lispro  0-4 Units SubCUTAneous Nightly    therapeutic multivitamin-minerals  1 tablet Oral Daily    atorvastatin  10 mg Oral Nightly    losartan  100 mg Oral Nightly     Continuous Infusions:   sodium chloride      dextrose      lactated ringers IV soln 75 mL/hr at 01/05/24 0026     PRN Meds:sodium chloride flush, sodium chloride, ketorolac, oxyCODONE **OR** oxyCODONE, HYDROmorphone **OR** HYDROmorphone, cyclobenzaprine, ondansetron, glucose, dextrose bolus **OR** dextrose bolus, glucagon (rDNA), dextrose    Allergies   Allergen Reactions    Nuts [Peanut-Containing Drug Products] Anaphylaxis    Shellfish-Derived Products Anaphylaxis    Lisinopril Other (See Comments)     lathargic    Metformin And Related Diarrhea     Principal Problem:    History of total right knee replacement  Resolved Problems:    * No resolved hospital problems. *    Blood pressure (!) 167/73, pulse 87, temperature 99 °F (37.2 °C), temperature source Oral, resp. rate 20, height 1.575 m (5' 2\"), weight 83.5 kg (184 lb), last menstrual period 07/09/2016, SpO2 98 %.    Subjective  Patient seen and examined, resting in bed comfortably, pain controlled, no new complaints.  She did have some increased pain 
Outpatient Pharmacy Progress Note for Meds-to-Beds    Total number of Prescriptions Filled: 3  The following medications were dispensed to the patient during the discharge process:  aspirin  cyclobenzaprine  oxyCODONE    Additional Documentation:  Medication(s) were delivered to the patient's room prior to discharge      Thank you for letting us serve your patients.  [place] [id number]    Phone: [phone]    Fax: [phone]        
Patient notified of surgery time at [facility] 1/4/24 1100 arrival 0800, NPO instructions and DOS meds reviewed   
Physical Therapy  Attempted to evaluate pt this date, per RN, pt still without full sensation. Will complete PT evaluation 1/5/24 AM.     
Physical Therapy Treatment Note  Name: Martha Burns MRN: [id number] :   1965   Date:  2024   Admission Date: 2024 [facility]   Restrictions/Precautions:  Fall precautions, WBAT R LE  Communication with other providers:  nursing  Subjective:  Patient states:  \"Im feeling a little woozy\"  Pain:  5/10, R knee, did not describe this session  Objective:    Observation:  Pt in chair asleep on arrival, easily awoken and agreeable to PT tx. Pt daughter present at bedside    Treatment, including education/measures:  Transfers: STS to/from chair x2 to RW w/ CGA; cues for hand placement and R LE placement, STS to/from commode to RW w/ CGA; cues for hand placement and R LE placement  Gait: Pt ambulated 40', then 8' x2 w RW and CGA w/ seated rest break in between each bout; cues for heel strike on R LE, initial step-to pattern, and w increased wendy cues to progress to step-through gait pattern as tolerated.  Pt demonstrated decreased stance time on R LE, decreased gait distance, and decreased step length.   Home Exercises/education: Pt educated on ankle pumps x10, quad sets x5; cues for proper form. Pt educated on use of iceman.  Assessment / Impression:    Pt left in chair, chair alarm on, call light within reach, nursing notified, gait belt used, all needs met.     Patient's tolerance of treatment:  Good   Adverse Reaction: n/a  Significant change in status and impact:  n/a  Barriers to improvement:  Pain limiting mobility  Plan for Next Session:    Pt is safe to D/C home with HHPT if medically cleared    Time in:  1234  Time out:  1253  Timed treatment minutes:  16  Total treatment time:  19    Previously filed items:  Social/Functional History  Lives With: Daughter  Type of Home: Apartment  Home Layout: One level  Home Access: Level entry  Bathroom Shower/Tub: Tub/Shower unit  Bathroom Toilet: Standard  Has the patient had two or more falls in the past year or any fall with injury in the past 
postoperatively    Continue weight bearing as tolerated.  Continue range of motion exercises.  Pain control.  DVT prophylaxis.  Continue to ice and elevate.  Continue PT/OT.  Discharge planning for home tomorrow.    GEOVANNY HOLBROOK, DO  1/4/2024    
0-4 Units SubCUTAneous Nightly    therapeutic multivitamin-minerals  1 tablet Oral Daily    atorvastatin  10 mg Oral Nightly    losartan  100 mg Oral Nightly      Infusions:    sodium chloride      dextrose      lactated ringers IV soln 75 mL/hr at 01/05/24 0026     PRN Meds: sodium chloride flush, 5-40 mL, PRN  sodium chloride, , PRN  ketorolac, 15 mg, Q6H PRN  oxyCODONE, 5 mg, Q4H PRN   Or  oxyCODONE, 10 mg, Q4H PRN  HYDROmorphone, 0.25 mg, Q3H PRN   Or  HYDROmorphone, 0.5 mg, Q3H PRN  cyclobenzaprine, 10 mg, Q12H PRN  ondansetron, 4 mg, Q6H PRN  glucose, 4 tablet, PRN  dextrose bolus, 125 mL, PRN   Or  dextrose bolus, 250 mL, PRN  glucagon (rDNA), 1 mg, PRN  dextrose, , Continuous PRN        Labs      Recent Results (from the past 24 hour(s))   POCT Glucose    Collection Time: 01/04/24  1:04 PM   Result Value Ref Range    POC Glucose 102 (H) 70 - 99 MG/DL   POCT Glucose    Collection Time: 01/04/24  5:44 PM   Result Value Ref Range    POC Glucose 174 (H) 70 - 99 MG/DL   POCT Glucose    Collection Time: 01/04/24  8:31 PM   Result Value Ref Range    POC Glucose 198 (H) 70 - 99 MG/DL   Hemoglobin and hematocrit, blood    Collection Time: 01/05/24  5:19 AM   Result Value Ref Range    Hemoglobin 12.1 (L) 12.5 - 16.0 GM/DL    Hematocrit 37.9 37 - 47 %   POCT Glucose    Collection Time: 01/05/24  7:42 AM   Result Value Ref Range    POC Glucose 165 (H) 70 - 99 MG/DL        Imaging/Diagnostics Last 24 Hours   XR KNEE RIGHT (1-2 VIEWS)    Result Date: 1/4/2024  EXAMINATION: TWO X-RAY VIEWS OF THE RIGHT KNEE 1/4/2024 1:47 pm COMPARISON: November 20, 2023 HISTORY: ORDERING SYSTEM PROVIDED HISTORY:  S/P R TKA TECHNOLOGIST PROVIDED HISTORY: Of operative side while in recovery room. Reason for Exam:  S/P R TKA Reason for Exam:  Of operative side while in recovery room. Additional signs and symptoms:  Of operative side while in recovery room. Relevant Medical/Surgical History:  Of operative side while in recovery room. FINDINGS:

## 2024-01-05 NOTE — CONSULTS
Consult cancelled. Pt has discharge orders.    Consult the Vascular Access Team for questions, concerns, or change in patient's condition.    
100%    Weight:       Height:           Medications Prior to Admission     Prior to Admission medications    Medication Sig Start Date End Date Taking? Authorizing Provider   Multiple Vitamins-Minerals (THERAPEUTIC MULTIVITAMIN-MINERALS) tablet Take 1 tablet by mouth daily    Mariusz Gonzalez MD   Multiple Vitamins-Minerals (HAIR SKIN & NAILS ADVANCED PO) Take by mouth daily    Mariusz Gonzalez MD   Probiotic Product (PROBIOTIC ADVANCED PO) Take by mouth    Mariusz Gonzalez MD   EPINEPHrine (EPIPEN) 0.3 MG/0.3ML SOAJ injection  8/14/23   Mariusz Gonzalez MD   loratadine (CLARITIN) 10 MG tablet  8/14/23   Mariusz Gonzalez MD   aspirin 81 MG chewable tablet Take 1 tablet by mouth nightly    Mariusz Gonzalez MD   Continuous Blood Gluc Sensor (DEXCOM G6 SENSOR) MISC  4/24/23   Mariusz Gonzalez MD   TRULICITY 3 MG/0.5ML SOPN once a week Takes on Wednesday 5/22/23   Mariusz Gonzalez MD   glipiZIDE (GLUCOTROL XL) 5 MG extended release tablet nightly 5/9/23   Mariusz Gonzalez MD   losartan (COZAAR) 100 MG tablet nightly 4/27/23   Mariusz Gonzalez MD   omeprazole (PRILOSEC) 40 MG delayed release capsule  5/1/23   Mariusz Gonzalez MD   pioglitazone (ACTOS) 30 MG tablet nightly 5/22/23   Mariusz Gonzalez MD   simvastatin (ZOCOR) 10 MG tablet  5/22/23   Mariusz Gonzalez MD   lidocaine (LIDODERM) 5 % Place 1 patch onto the skin daily 12 hours on, 12 hours off. 12/2/18   Yary Khan PA-C   methocarbamol (ROBAXIN) 500 MG tablet Take 1 tablet by mouth 3 times daily As needed for muscle spasm.  Patient not taking: Reported on 1/4/2024 12/2/18   Yary Khan PA-C   naproxen (NAPROSYN) 500 MG tablet Take 1 tablet by mouth 2 times daily as needed for Pain 12/2/18   Yary Khan PA-C   permethrin (ELIMITE) 5 % cream Apply topically as directed 10/26/18   Priti Vogt MD   hydrocortisone acetate 1 % CREA Apply 1 Tube topically three times daily Until 
management; painful   Transfers: STS to RW from EOB w/ CGA; reported dizziness upon standing, STS to chair w/ RW and CGA; cues for hand placement and R LE placement, painful  Sitting balance:  Good, unsupported EOB  Standing balance:  Good, to RW w/ CGA for 1 min   Gait: Pt ambulated 10' in room w RW and CGA; cues for step to gait pattern, walker management and hand placement. Pt demonstrated decreased gait tolerance, decreased stance time on R LE, decreased step length, and decreased wendy.     [facility] 6 Clicks Inpatient Mobility:  AM-PAC Inpatient Mobility Raw Score : 17    Safety: patient left in chair, chair alarm on, call light within reach, RN notified, gait belt used.    Assessment:  Pt is a 58 y.o. female who presents s/p R TKA performed on 1/4/24. Pt was previously independent w ambulation and transfers. Today pt demonstrated bed mobility with Min A for R LE management, transfers w/ RW and CGA, and ambulated for 10' w/ RW and CGA. Pt is presenting with decreased activity tolerance, increased pain, decreased gait distance, impaired bed mobility, and decreased strength. Pt would benefit from continued PT services until D/C. Following D/C pt would benefit from HHPT.     Complexity: moderate    Prognosis: Good, no significant barriers to participation at this time  General Plan: 2 times a day 7 days a week     Equipment: Pt will need RW at d/c    Goals:  Short Term Goals  Time Frame for Short Term Goals: 1 week  Short Term Goal 1: Pt will complete bed mobility w/ mod I  Short Term Goal 2: Pt will completed STS transfer to RW w/ mod I  Short Term Goal 3: Pt will ambulate 50' w/ RW w mod I       Treatment plan:  Bed mobility, transfers, balance, gait, TA, TX    Recommendations for NURSING mobility: Amb CGA w/ RW    Time:   Time in: 0845  Time out: 0905  Timed treatment minutes: 10  Total time: 20    Electronically signed by:    ISAI Jenkins  1/5/2024, 12:02 PM

## 2024-01-10 ENCOUNTER — TELEPHONE (OUTPATIENT)
Dept: ORTHOPEDIC SURGERY | Age: 59
End: 2024-01-10

## 2024-01-10 NOTE — TELEPHONE ENCOUNTER
Patient called and requested order for physical therapy, after right knee total arthroplasty, DOS 1/4/2024.     Patient states RN EUNICE gave her a list of insurance approved places local to the patient in [place], [place].  No order was entered or referral sent. Patient was asked to find the list of approved PT facilities and call this nurse back for her to put the referral in for PT.     Patient verbalized understanding and is looking for list at this time and will call back.

## 2024-01-12 ENCOUNTER — TELEPHONE (OUTPATIENT)
Dept: ORTHOPEDIC SURGERY | Age: 59
End: 2024-01-12

## 2024-01-12 DIAGNOSIS — Z96.651 S/P TOTAL KNEE ARTHROPLASTY, RIGHT: Primary | ICD-10-CM

## 2024-01-12 NOTE — TELEPHONE ENCOUNTER
Patient advised [facility] could not take her as patient d/t staffing. Patient to call insurance company to obtain list of covered PT for a new referral to be sent.

## 2024-01-12 NOTE — TELEPHONE ENCOUNTER
Referral sent for home health PT, sent to [facility], via fax [phone], ph# [phone]

## 2024-01-16 ENCOUNTER — TELEPHONE (OUTPATIENT)
Dept: ORTHOPEDIC SURGERY | Age: 59
End: 2024-01-16

## 2024-01-16 NOTE — TELEPHONE ENCOUNTER
Patient aware this nurse is still working on her home health, patient advised if need be she could switch to outpatient PT. Patient agreed to wait a little longer to hear back from home health companies.     Patient educated to continue doing home exercises and to call with any other concerns.

## 2024-01-17 DIAGNOSIS — Z96.651 S/P TOTAL KNEE ARTHROPLASTY, RIGHT: Primary | ICD-10-CM

## 2024-01-18 NOTE — TELEPHONE ENCOUNTER
Patient called and LVM to inquire about why the PT referral was not sent to [facility]. Patient was called back and LVM to explain process of PT referral and that if patient had any further concerns she should call this nurse back at the direct line.

## 2024-01-23 ENCOUNTER — HOSPITAL ENCOUNTER (OUTPATIENT)
Dept: PHYSICAL THERAPY | Age: 59
Setting detail: THERAPIES SERIES
Discharge: HOME | End: 2024-01-23
Payer: COMMERCIAL

## 2024-01-23 PROCEDURE — 97161 PT EVAL LOW COMPLEX 20 MIN: CPT

## 2024-01-23 ASSESSMENT — PAIN SCALES - GENERAL: PAINLEVEL_OUTOF10: 6

## 2024-01-23 ASSESSMENT — PAIN DESCRIPTION - LOCATION: LOCATION: KNEE;ANKLE

## 2024-01-23 ASSESSMENT — PAIN DESCRIPTION - ORIENTATION: ORIENTATION: RIGHT

## 2024-01-23 ASSESSMENT — PAIN DESCRIPTION - PAIN TYPE: TYPE: SURGICAL PAIN

## 2024-01-23 NOTE — FLOWSHEET NOTE
.       Outpatient Physical Therapy  [place]           [x] Phone: [phone]   Fax: [phone]  [place]           [] Phone: [phone]   Fax: [phone]        Physical Therapy Daily Treatment Note  Date:  2024    Patient Name:  Martha Burns    :  1965  MRN: [id number]  Restrictions/Precautions: No data recorded   Position Activity Restriction  Other position/activity restrictions: No formal restrictions  Diagnosis:   S/P total knee arthroplasty, right [Z96.651] Diagnosis: R TKA  Date of Injury/Surgery:   Treatment Diagnosis:  R knee pain, impaired ROM and strength, antaligic gait, pain  Insurance/Certification information:    Referring Physician:  Amilcar Esteves DO     PCP: Vahe Zamudio MD  Next Doctor Visit:  unknown  Plan of care signed (Y/N):  Pending  Outcome Measure:   LEFS  Visit# / total visits:     Pain level: 6/10   Goals:     Patient goals: Walk w/o difficulty or pain  Short term goals  Time Frame for Short term goals: Defer to [facility]     Long Term Goals  Time Frame for Long Term Goals: In 4 weeks, patient will  demonstrate compliance and independence w/HEP to advance mobility and strength for daily activities.  score at <= 25% disability on  LEFS as indication of improved function w/daily activities.  improve knee AROM to 0-120 for functional range w/MRADLs, improved ease of mobility.  improve knee strength to >= 4/5 for basic functional strength required for safe mobility w/o use of AD.  Ambulate >= 1,000 ft in 6', no AD, normalized gait for community gait/safety.      Summary of Evaluation:  Assessment: Patient is a 58 y.o. female s/p R TKA.Pt reports having chronic R knee pain for years. She also reports her left knee needs replaced also. PLOF: Independent w/adls, mobility, homemaking, drove, painful R knee and getting cortisone shots for at least 1 year. CURRENT LOF: Walking w/a RW Mod Ind., Mod Ind. sponse bath/shower, dtr helping w/50% of the homemaking tasks

## 2024-01-23 NOTE — PROGRESS NOTES
complaint of R ankle pain x a few days.  There is mild bruising medial arch, no warmth or redness, mild TTP.  There is a good pedal pulse.  Otherwise,  patient presents w/antalgic gait using RW, impaired ROM and strength, significant amount of pain w/gentle ROM exercises and will benefit from physical therapy to regain function post TKA.    Body Structures, Functions, Activity Limitations Requiring Skilled Therapeutic Intervention: Decreased functional mobility , Decreased ADL status, Decreased ROM, Decreased strength, Decreased high-level IADLs, Increased pain    Statement of Medical Necessity: Physical Therapy is both indicated and medically necessary as outlined in the POC to increase the likelihood of meeting the functionally related goals stated below.     Patient's Activity Tolerance: Patient limited by pain, Patient tolerated evaluation without incident      Patient's rehabilitation potential/prognosis is considered to be: Good    Factors which may impact rehabilitation potential include: Pain tolerance/management        GOALS   Patient Goal(s): Walk w/o difficulty or pain  Short Term Goals Completed by Defer to LTGs Goal Status     Long Term Goals Completed by In 4 weeks, patient will Goal Status   demonstrate compliance and independence w/HEP to advance mobility and strength for daily activities.     score at <= 25% disability on  LEFS as indication of improved function w/daily activities.     improve knee AROM to 0-120 for functional range w/MRADLs, improved ease of mobility.     improve knee strength to >= 4/5 for basic functional strength required for safe mobility w/o use of AD.     Ambulate >= 1,000 ft in 6', no AD, normalized gait for community gait/safety.     ascend/descend >= 6 steps, reciprocal pattern using (1) HR for light support, normalized pattern.                                  TREATMENT PLAN       Requires PT Follow-Up: Yes  Specific Instructions for Next Treatment: Advance per typical

## 2024-01-24 ENCOUNTER — OFFICE VISIT (OUTPATIENT)
Dept: ORTHOPEDIC SURGERY | Age: 59
End: 2024-01-24

## 2024-01-24 VITALS
WEIGHT: 183 LBS | HEART RATE: 76 BPM | RESPIRATION RATE: 16 BRPM | BODY MASS INDEX: 33.68 KG/M2 | HEIGHT: 62 IN | OXYGEN SATURATION: 98 %

## 2024-01-24 DIAGNOSIS — Z96.651 S/P TOTAL KNEE ARTHROPLASTY, RIGHT: Primary | ICD-10-CM

## 2024-01-24 PROCEDURE — 99024 POSTOP FOLLOW-UP VISIT: CPT | Performed by: PHYSICIAN ASSISTANT

## 2024-01-24 NOTE — PRE-CERTIFICATION NOTE
Pt approved for 50 units of each [id number]  [id number]  77601  32679  89266  54677  83751    01/24/24-4/24/24

## 2024-01-24 NOTE — PATIENT INSTRUCTIONS
Continue doing physical therapy  Continue using walker or cane  Continue weight bear as tolerated  Continue range of motion and exercises as instruction  Ice and elevate as needed  Tylenol or Motrin for pain  Follow up in 3 weeks with Dr. Esteves.    If you have any question post operatively. Please contact office to speak with [facility] Navigator.     We are committed to providing you the best care possible.     If you receive a survey after visiting one of our offices, please take time to share your experience concerning your physician office visit.  These surveys are confidential and no health information about you is shared.     We are eager to improve for you and we are counting on your feedback to help make that happen.

## 2024-01-24 NOTE — PROGRESS NOTES
Date of surgery:   1/4/2024  Surgeon: Dr. Esteves    History:  Ms. Martha Burns is here in follow up regarding her right total knee arthroplasty.  She states that she is doing well and has been working on therapy on her own.  She denies any chest pain, shortness of breath, nausea, vomiting.  She states that she is back down on the oxycodone and is now just using Tylenol and Advil, as well as a muscle relaxer.    Physical:   Vitals:    01/24/24 0903   Pulse: 76   Resp: 16   SpO2: 98%     Surgical incision over the right knee is well-healed with no active drainage.  Minimal edema in the right lower extremity  Tenderness to palpation over the medial right ankle.  Range of motion of the right knee shows passive extension to 0 degrees but active extension to approximately 15 degrees short of full extension.  Flexion: 105 degrees.      Impression: Status post above, doing well       Plan:   I explained to the patient that she is doing well working on her own with the exercises and that she should continue to get better when she works with formal physical therapy.  It is okay to get the incision wet and she can take a bath if she wants to.  Driving recommended not until between 4 and 6 weeks and not until she is completely off the narcotic pain medication.  Patient Instructions   Continue doing physical therapy  Continue using walker or cane  Continue weight bear as tolerated  Continue range of motion and exercises as instruction  Ice and elevate as needed  Tylenol or Motrin for pain  Follow up in 3 weeks with Dr. Esteves.    If you have any question post operatively. Please contact office to speak with Leilani Graham Navigator.     We are committed to providing you the best care possible.     If you receive a survey after visiting one of our offices, please take time to share your experience concerning your physician office visit.  These surveys are confidential and no health information about you is shared.     We are

## 2024-01-25 NOTE — PLAN OF CARE
Patients Plan of Care was received and signed. Signed POC was scanned and placed in the patients chart.    Janae Hudson      
pain    Statement of Medical Necessity: Physical Therapy is both indicated and medically necessary as outlined in the POC to increase the likelihood of meeting the functionally related goals stated below.     Patient's Activity Tolerance: Patient limited by pain, Patient tolerated evaluation without incident      Patient's rehabilitation potential/prognosis is considered to be: Good    Factors which may impact rehabilitation potential include: Pain tolerance/management        GOALS   Patient Goal(s): Walk w/o difficulty or pain  Short Term Goals Completed by Defer to LTGs Goal Status     Long Term Goals Completed by In 4 weeks, patient will Goal Status   demonstrate compliance and independence w/HEP to advance mobility and strength for daily activities.     score at <= 25% disability on  LEFS as indication of improved function w/daily activities.     improve knee AROM to 0-120 for functional range w/MRADLs, improved ease of mobility.     improve knee strength to >= 4/5 for basic functional strength required for safe mobility w/o use of AD.     Ambulate >= 1,000 ft in 6', no AD, normalized gait for community gait/safety.     ascend/descend >= 6 steps, reciprocal pattern using (1) HR for light support, normalized pattern.                                  TREATMENT PLAN       Requires PT Follow-Up: Yes  Specific Instructions for Next Treatment: Advance per typical TKA protocol.  Eventual gait w/o  AD.    Pt. actively involved in establishing Plan of Care and Goals:   Patient/ Caregiver education and instruction: Goals, PT Role, Plan of Care, Evaluative findings, Home Exercise Program             Treatment may include any combination of the following: Current Treatment Recommendations: Strengthening, ROM, Balance training, Neuromuscular re-education, Gait training, Stair training, Manual, Pain management, Home exercise program, Modalities, Patient/Caregiver education & training, Therapeutic activities  Modalities:

## 2024-01-26 ENCOUNTER — HOSPITAL ENCOUNTER (OUTPATIENT)
Dept: PHYSICAL THERAPY | Age: 59
Setting detail: THERAPIES SERIES
Discharge: HOME | End: 2024-01-26
Payer: COMMERCIAL

## 2024-01-26 PROCEDURE — 97140 MANUAL THERAPY 1/> REGIONS: CPT

## 2024-01-26 PROCEDURE — 97110 THERAPEUTIC EXERCISES: CPT

## 2024-01-26 PROCEDURE — 97016 VASOPNEUMATIC DEVICE THERAPY: CPT

## 2024-01-26 NOTE — FLOWSHEET NOTE
.       Outpatient Physical Therapy  [place]           [x] Phone: [phone]   Fax: [phone]  [place]           [] Phone: [phone]   Fax: [phone]        Physical Therapy Daily Treatment Note  Date:  2024    Patient Name:  Martha Burns    :  1965  MRN: [id number]  Restrictions/Precautions: No data recorded   Position Activity Restriction  Other position/activity restrictions: No formal restrictions  Diagnosis:   S/P total knee arthroplasty, right [Z96.651] Diagnosis: R TKA  Date of Injury/Surgery:   Treatment Diagnosis:  R knee pain, impaired ROM and strength, antaligic gait, pain  Insurance/Certification information:  Pt approved for 50 units of each [id number]  [id number]  73324  42908  13679  04873  45699     24-24  Referring Physician:  Amilcar Esteves DO     PCP: Vahe Zamudio MD  Next Doctor Visit:  unknown  Plan of care signed (Y/N):  Pending  Outcome Measure:   LEFS  Visit# / total visits:     Pain level: 6/10   Goals:     Patient goals: Walk w/o difficulty or pain  Short term goals  Time Frame for Short term goals: Defer to LTGs     Long Term Goals  Time Frame for Long Term Goals: In 4 weeks, patient will  demonstrate compliance and independence w/HEP to advance mobility and strength for daily activities.  score at <= 25% disability on  LEFS as indication of improved function w/daily activities.  improve knee AROM to 0-120 for functional range w/MRADLs, improved ease of mobility.  improve knee strength to >= 4/5 for basic functional strength required for safe mobility w/o use of AD.  Ambulate >= 1,000 ft in 6', no AD, normalized gait for community gait/safety.      Summary of Evaluation:  Assessment: Patient is a 58 y.o. female s/p R TKA.Pt reports having chronic R knee pain for years. She also reports her left knee needs replaced also. PLOF: Independent w/adls, mobility, homemaking, drove, painful R knee and getting cortisone shots for at least 1 year. CURRENT

## 2024-01-30 ENCOUNTER — HOSPITAL ENCOUNTER (OUTPATIENT)
Dept: PHYSICAL THERAPY | Age: 59
Setting detail: THERAPIES SERIES
Discharge: HOME | End: 2024-01-30
Payer: COMMERCIAL

## 2024-01-30 PROCEDURE — 97110 THERAPEUTIC EXERCISES: CPT

## 2024-01-30 PROCEDURE — 97112 NEUROMUSCULAR REEDUCATION: CPT

## 2024-01-30 NOTE — FLOWSHEET NOTE
.       Outpatient Physical Therapy  [place]           [x] Phone: [phone]   Fax: [phone]  [place]           [] Phone: [phone]   Fax: [phone]        Physical Therapy Daily Treatment Note  Date:  2024    Patient Name:  Martha Burns    :  1965  MRN: [id number]  Restrictions/Precautions: No data recorded   Position Activity Restriction  Other position/activity restrictions: No formal restrictions  Diagnosis:   S/P total knee arthroplasty, right [Z96.651] Diagnosis: R TKA  Date of Injury/Surgery:   Treatment Diagnosis:  R knee pain, impaired ROM and strength, antaligic gait, pain  Insurance/Certification information:  Pt approved for 50 units of each [id number]  15176  00663  26036  82468  84261  75494     24-24  Referring Physician:  Amilcar Esteves DO     PCP: Vahe Zamudio MD  Next Doctor Visit:  unknown  Plan of care signed (Y/N):  Y  Outcome Measure: LEFS    Visit# / total visits:   3/8  Pain level: 6/10   Goals:     Patient goals: Walk w/o difficulty or pain  Short term goals  Time Frame for Short term goals: Defer to LTGs     Long Term Goals  Time Frame for Long Term Goals: In 4 weeks, patient will  demonstrate compliance and independence w/HEP to advance mobility and strength for daily activities.  score at <= 25% disability on  LEFS as indication of improved function w/daily activities.  improve knee AROM to 0-120 for functional range w/MRADLs, improved ease of mobility.  improve knee strength to >= 4/5 for basic functional strength required for safe mobility w/o use of AD.  Ambulate >= 1,000 ft in 6', no AD, normalized gait for community gait/safety.      Summary of Evaluation:  Assessment: Patient is a 58 y.o. female s/p R TKA.Pt reports having chronic R knee pain for years. She also reports her left knee needs replaced also. PLOF: Independent w/adls, mobility, homemaking, drove, painful R knee and getting cortisone shots for at least 1 year. CURRENT LOF:

## 2024-02-07 ENCOUNTER — HOSPITAL ENCOUNTER (OUTPATIENT)
Dept: PHYSICAL THERAPY | Age: 59
Setting detail: THERAPIES SERIES
Discharge: HOME | End: 2024-02-07
Payer: COMMERCIAL

## 2024-02-07 ENCOUNTER — TELEPHONE (OUTPATIENT)
Dept: ORTHOPEDIC SURGERY | Age: 59
End: 2024-02-07

## 2024-02-07 PROCEDURE — 97140 MANUAL THERAPY 1/> REGIONS: CPT

## 2024-02-07 PROCEDURE — 97112 NEUROMUSCULAR REEDUCATION: CPT

## 2024-02-07 PROCEDURE — 97110 THERAPEUTIC EXERCISES: CPT

## 2024-02-07 RX ORDER — CYCLOBENZAPRINE HCL 10 MG
10 TABLET ORAL 3 TIMES DAILY PRN
Qty: 30 TABLET | Refills: 0 | Status: SHIPPED | OUTPATIENT
Start: 2024-02-07 | End: 2024-02-17

## 2024-02-07 NOTE — TELEPHONE ENCOUNTER
Patient requesting  to remain out of work until 2/19/24 due to being very stiff. Patient requesting refill of flexeril

## 2024-02-07 NOTE — FLOWSHEET NOTE
treatment. Pt rated pain at 5/10 after treatment.  Pt will continue to benefit from more strengthening, ROM, and balance.         Assessment: Patient is a 58 y.o. female s/p R TKA.Pt reports having chronic R knee pain for years. She also reports her left knee needs replaced also. PLOF: Independent w/adls, mobility, homemaking, drove, painful R knee and getting cortisone shots for at least 1 year. CURRENT LOF: Walking w/a RW Mod Ind., Mod Ind. sponse bath/shower, dtr helping w/50% of the homemaking tasks and most errands since surgery.  Today:  Pt had complaint of R ankle pain x a few days.  There is mild bruising medial arch, no warmth or redness, mild TTP.  There is a good pedal pulse.  Otherwise,  patient presents w/antalgic gait using RW, impaired ROM and strength, significant amount of pain w/gentle ROM exercises and will benefit from physical therapy to regain function post TKA.      Plan for Next Session: Russian stim as fredrick R quad/NRE, VASO  Specific Instructions for Next Treatment: Advance per typical TKA protocol.  Eventual gait w/o  AD.    Time In / Time Out: 1120/1215       If [employer] Please Indicate Units for Rx this date and running total for each and overall total for Rx to date:  (No billed tx allowed day of eval)  50 units of each 81232  24198  10453  74614  16372  91734  47814    CPT Code Units today Running Total Units Total approved    TE 39775  2 6    MAN 56103  1 2    Gait 41019      NR 47754 1 2    TA  09814      Estim Unatt 26529       US [address]      [facility] [place] [facility] [place] [address]      VASO 31034   1    ADL/Self care 63130      DNT 1-2 77399      DNT 3-4 20561      Other:     1           Total for episode of care   12               Timed Code/Total Treatment Minutes: 55'/55'  1 man (15') 2 TE ( 30') 1 neuro (10')       Next Progress Note due:  2/23/24      Plan of Care Interventions:  [x] Therapeutic Exercise  [x] Modalities:  [x] Therapeutic Activity     [] Ultrasound  [x] Estim  [x] Gait Training      []

## 2024-02-08 ENCOUNTER — HOSPITAL ENCOUNTER (OUTPATIENT)
Dept: PHYSICAL THERAPY | Age: 59
Setting detail: THERAPIES SERIES
Discharge: HOME | End: 2024-02-08
Payer: COMMERCIAL

## 2024-02-08 PROCEDURE — 97112 NEUROMUSCULAR REEDUCATION: CPT

## 2024-02-08 PROCEDURE — 97140 MANUAL THERAPY 1/> REGIONS: CPT

## 2024-02-08 PROCEDURE — 97110 THERAPEUTIC EXERCISES: CPT

## 2024-02-08 NOTE — FLOWSHEET NOTE
flexion and extension x 15'     Modalities:  none    Communication with other providers:    POC faxed 1/23    Assessment:  (Response towards treatment session) (Pain Rating) Pt tolerated treatment fair today. Pt was able to get more PROM extension  after manual, but not as much flexion as yesterday.  Pt was able to increase activity in the gym today. Pt also had improved gt after treatment. Pt rated pain at 4/10 after treatment.  Pt will continue to benefit from more strengthening, ROM, and balance.         Assessment: Patient is a 58 y.o. female s/p R TKA.Pt reports having chronic R knee pain for years. She also reports her left knee needs replaced also. PLOF: Independent w/adls, mobility, homemaking, drove, painful R knee and getting cortisone shots for at least 1 year. CURRENT LOF: Walking w/a RW Mod Ind., Mod Ind. sponse bath/shower, dtr helping w/50% of the homemaking tasks and most errands since surgery.  Today:  Pt had complaint of R ankle pain x a few days.  There is mild bruising medial arch, no warmth or redness, mild TTP.  There is a good pedal pulse.  Otherwise,  patient presents w/antalgic gait using RW, impaired ROM and strength, significant amount of pain w/gentle ROM exercises and will benefit from physical therapy to regain function post TKA.      Plan for Next Session: Russian stim as fredrick R quad/NRE, VASO  Specific Instructions for Next Treatment: Advance per typical TKA protocol.  Eventual gait w/o  AD.    Time In / Time Out:   1347/1430    If [employer] Please Indicate Units for Rx this date and running total for each and overall total for Rx to date:  (No billed tx allowed day of eval)  50 units of each 90415  72437  94433  48254  12872  53390  36839    CPT Code Units today Running Total Units Total approved    TE 18321  1 7    MAN 81098  1 3    Gait 11688      NR 16034 1 3    TA  27933      Estim [place]att [address]       [place] [address]      [facility] [place] [address]      VASO 81710   1    ADL/Self care 03468      DNT

## 2024-02-12 ENCOUNTER — OFFICE VISIT (OUTPATIENT)
Dept: ORTHOPEDIC SURGERY | Age: 59
End: 2024-02-12

## 2024-02-12 VITALS
HEIGHT: 62 IN | BODY MASS INDEX: 30.36 KG/M2 | WEIGHT: 165 LBS | OXYGEN SATURATION: 91 % | RESPIRATION RATE: 12 BRPM | HEART RATE: 67 BPM

## 2024-02-12 DIAGNOSIS — Z96.651 S/P TOTAL KNEE ARTHROPLASTY, RIGHT: Primary | ICD-10-CM

## 2024-02-12 PROCEDURE — 99024 POSTOP FOLLOW-UP VISIT: CPT | Performed by: ORTHOPAEDIC SURGERY

## 2024-02-12 RX ORDER — CYCLOBENZAPRINE HCL 10 MG
10 TABLET ORAL 3 TIMES DAILY PRN
Qty: 30 TABLET | Refills: 0 | Status: SHIPPED | OUTPATIENT
Start: 2024-02-12 | End: 2024-02-22

## 2024-02-12 RX ORDER — TIRZEPATIDE 5 MG/.5ML
INJECTION, SOLUTION SUBCUTANEOUS
COMMUNITY
Start: 2024-02-09

## 2024-02-12 NOTE — PROGRESS NOTES
Patient returns to the office with a  6 week post op of a right TKA. She stated that she seems to be progressing but notices consistent stiffness she has been working on with therapy. Pt stated that she is having issues with the flexion within the knee. However, pt stated her pain is minimal and has discontinued pain medication a couple of days ago. Observed using a cane.   
of breath)        Objective:   Physical Exam  Constitutional:       Appearance: She is well-developed.   HENT:      Head: Normocephalic.      Nose: No congestion.   Eyes:      Pupils: Pupils are equal, round, and reactive to light.   Pulmonary:      Effort: Pulmonary effort is normal.   Musculoskeletal:         General: Swelling present. No tenderness or deformity.      Cervical back: Normal range of motion.      Right hip: Normal.      Left hip: Normal.      Right knee: Swelling present. No effusion, erythema, ecchymosis, lacerations, bony tenderness or crepitus. Decreased range of motion. No tenderness. No medial joint line, lateral joint line, MCL or LCL tenderness. No LCL laxity or MCL laxity. Normal alignment and normal patellar mobility.      Left knee: Swelling, bony tenderness and crepitus present. No effusion, erythema, ecchymosis or lacerations. Normal range of motion. No MCL or LCL tenderness. MCL laxity present. No LCL laxity.Normal alignment and normal patellar mobility.   Skin:     General: Skin is warm and dry.      Capillary Refill: Capillary refill takes less than 2 seconds.      Coloration: Skin is not pale.      Findings: No erythema or rash.   Neurological:      Mental Status: She is alert and oriented to person, place, and time.      Sensory: No sensory deficit.      Motor: No weakness.       Right knee-Incision clean, dry, intact, with no erythema, no drainage, and no signs of infection.  0-90    Left knee-Skin intact with no erythema, ecchymosis or lacerations present.  0-130  Mild valgus deformity, significant laxity of the MCL    XR KNEE RIGHT (3 VIEWS)    Result Date: 2/12/2024  XRAY X-ray 3 views of the right knee obtained and reviewed by me today in the office demonstrates age appropriate bone density throughout with well-positioned right total knee arthroplasty, there has been no change in position of components compared to prior x-rays, normal tracking of the patella, no acute osseous

## 2024-02-12 NOTE — PATIENT INSTRUCTIONS
Continue weight-bearing as tolerated.  Continue range of motion exercises as instructed.  Ice and elevate as needed.  Tylenol or Motrin for pain.  Follow up as scheduled.

## 2024-02-21 ENCOUNTER — HOSPITAL ENCOUNTER (OUTPATIENT)
Dept: PHYSICAL THERAPY | Age: 59
Setting detail: THERAPIES SERIES
Discharge: HOME | End: 2024-02-21
Payer: COMMERCIAL

## 2024-02-21 PROCEDURE — 97140 MANUAL THERAPY 1/> REGIONS: CPT

## 2024-02-21 PROCEDURE — 97112 NEUROMUSCULAR REEDUCATION: CPT

## 2024-02-21 PROCEDURE — 97110 THERAPEUTIC EXERCISES: CPT

## 2024-02-21 NOTE — PROGRESS NOTES
condition is expected to improve within the treatment timeframe we are requesting.    Electronically signed by:  Nicolette Da Silva, PT 2/21/2024, 1:42 PM    If you have any questions or concerns, please don't hesitate to call.  Thank you for your referral.    Physician Signature:______________________ Date:______ Time: ________  By signing above, therapist’s plan is approved by physician

## 2024-02-23 ENCOUNTER — HOSPITAL ENCOUNTER (OUTPATIENT)
Dept: PHYSICAL THERAPY | Age: 59
Setting detail: THERAPIES SERIES
Discharge: HOME | End: 2024-02-23
Payer: COMMERCIAL

## 2024-02-23 PROCEDURE — 97140 MANUAL THERAPY 1/> REGIONS: CPT

## 2024-02-23 PROCEDURE — 97110 THERAPEUTIC EXERCISES: CPT

## 2024-02-23 PROCEDURE — 97112 NEUROMUSCULAR REEDUCATION: CPT

## 2024-02-23 NOTE — FLOWSHEET NOTE
Instructions for Next Treatment: Advance per typical TKA protocol.  Eventual gait w/o  AD.    Time In / Time Out:  1346/1430    If [employer] Please Indicate Units for Rx this date and running total for each and overall total for Rx to date:  (No billed tx allowed day of eval)    50 units of each 00534  96232  77638  35931  61523  07096  01881    CPT Code Units today Running Total Units Total approved    TE 21688  1 9    MAN 56643  1 5    Gait 88299      NR 29067 1 5    TA  77554      Estim Unatt [address]1069        [address]      [facility] [place] [address]      VASO 98779   1    ADL/Self care 25417      DNT 1-2 84241      DNT 3-4 20561      Other:     1           Total for episode of care   21               Timed Code/Total Treatment Minutes  44'/44'  1 TE (24') 1  Manual (10')   1 neuro  (10')       Next Progress Note due:  3/21/24      Plan of Care Interventions:  [x] Therapeutic Exercise  [x] Modalities:  [x] Therapeutic Activity     [] Ultrasound  [x] Estim  [x] Gait Training      [] Cervical Traction [] Lumbar Traction  [x] Neuromuscular Re-education    [] Cold/hotpack [] Iontophoresis   [x] Instruction in HEP      [x] Vasopneumatic   [] Dry Needling    [x] Manual Therapy               [x] Aquatic Therapy - will consider              Electronically signed by:  Yumiko Garirdo PTA  2/23/2024, 1:12 PM      2/23/2024,4:52 PM

## 2024-02-26 ENCOUNTER — TELEPHONE (OUTPATIENT)
Dept: ORTHOPEDIC SURGERY | Age: 59
End: 2024-02-26

## 2024-02-26 ENCOUNTER — HOSPITAL ENCOUNTER (OUTPATIENT)
Dept: PHYSICAL THERAPY | Age: 59
Discharge: HOME | End: 2024-02-26

## 2024-02-26 NOTE — FLOWSHEET NOTE
Physical Therapy  Cancellation/No-show Note  Patient Name:  Martha Burns  :  1965   Date:  2024  Cancelled visits to date: 1  No-shows to date:1    For today's appointment patient:  [x]  Cancelled  []  Rescheduled appointment  []  No-show     Reason given by patient:  []  Patient ill  []  Conflicting appointment  []  No transportation    []  Conflict with work  []  No reason given  []  Other:     Comments: Pt had called and cancelled appt then got back on the schedule and then called stating she thought she could but is unable.    Electronically signed by:  Mariza Kam, PTA      2024,10:38 AM

## 2024-02-28 ENCOUNTER — HOSPITAL ENCOUNTER (OUTPATIENT)
Dept: PHYSICAL THERAPY | Age: 59
Setting detail: THERAPIES SERIES
Discharge: HOME | End: 2024-02-28
Payer: COMMERCIAL

## 2024-02-28 PROCEDURE — 97110 THERAPEUTIC EXERCISES: CPT

## 2024-02-28 PROCEDURE — 97112 NEUROMUSCULAR REEDUCATION: CPT

## 2024-02-28 NOTE — FLOWSHEET NOTE
with cues to bend R knee and step through with L LE Review proper gait with cane to improve alignment; WS and step thru on RLE   High marching in // bars Airex 10x2      Mint squats in // bars       Hamstring curls in //bars       Ant step ups 4\" 10 x 2  with UE support and   w/ MA to bend R knee during L LE step down           Lat step ups 4\" 10 x 2 with UE support       PROPRIOCEPTION       Wobble board  A/P only 30\" x2       Cone taps 9\" cone 1 UE support 10 x 2 alt ea LE                           MODALITIES       VASO -- declined                Other Therapeutic Activities/Education:    1/24/24:  POC and treatment rationale/progression expected reviewed with patient.  Recommended patient be sure to take pain medication prior to treatment as pain was limiting us today.    Home Exercise Program:    Access Code: [email or web address]  URL: [email or web address]  Date: 01/23/2024  Prepared by: Nicolette Da Silva    Exercises  - Supine Ankle Pumps  - 2 x daily - 7 x weekly - 2 sets - 10 reps  - Quad Setting and Stretching  - 2 x daily - 7 x weekly - 2 sets - 10 reps  - Supine Knee Extension Strengthening  - 2 x daily - 7 x weekly - 2 sets - 10 reps  - Supine Heel Slide  - 2 x daily - 7 x weekly - 2 sets - 10 reps  - Supine Heel Slide with Strap  - 2 x daily - 7 x weekly - 2 sets - 5 reps  - Active Straight Leg Raise with Quad Set  - 2 x daily - 7 x weekly - 2 sets - 10 reps  - Supine Hip Abduction  - 2 x daily - 7 x weekly - 2 sets - 10 reps  - Seated Long Arc Quad  - 2 x daily - 7 x weekly - 2 sets - 10 reps  - Seated Hamstring Stretch  - 2-3 x daily - 7 x weekly - 1 sets - 3 reps  - Standing Ankle Dorsiflexion Stretch  - 2-3 x daily - 7 x weekly - 1 sets - 3 reps    Access Code: [email or web address]  URL: [email or web address]  Date: 01/30/2024  Prepared by: Nicolette Da Silva    Exercises  - Seated Hamstring Stretch  - 3 x daily - 7 x weekly - 1 sets - 3 reps  - Seated Knee Flexion AAROM  - 3 x daily - 7 x weekly - 1 sets - 10

## 2024-03-01 ENCOUNTER — HOSPITAL ENCOUNTER (OUTPATIENT)
Dept: PHYSICAL THERAPY | Age: 59
Setting detail: THERAPIES SERIES
Discharge: HOME | End: 2024-03-01
Payer: COMMERCIAL

## 2024-03-01 PROCEDURE — 97140 MANUAL THERAPY 1/> REGIONS: CPT

## 2024-03-01 PROCEDURE — 97112 NEUROMUSCULAR REEDUCATION: CPT

## 2024-03-01 PROCEDURE — 97110 THERAPEUTIC EXERCISES: CPT

## 2024-03-01 NOTE — FLOWSHEET NOTE
.       Outpatient Physical Therapy  [place]           [x] Phone: [phone]   Fax: [phone]  [place]           [] Phone: [phone]   Fax: [phone]        Physical Therapy Daily Treatment Note  Date:  3/1/2024    Patient Name:  Martha Burns    :  1965  MRN: [id number]  Restrictions/Precautions: No data recorded   Position Activity Restriction  Other position/activity restrictions: No formal restrictions  Diagnosis:   S/P total knee arthroplasty, right [Z96.651] Diagnosis: R TKA  Date of Injury/Surgery: 24  Treatment Diagnosis:  R knee pain, impaired ROM and strength, antaligic gait, pain  Insurance/Certification information:  Pt approved for 50 units of each [id number]  [id number]  [id number]  [id number]  [id number]  [id number]  14321   24-24  Referring Physician:  Amilcar Esteves DO     PCP: Vahe Zamudio MD  Next Doctor Visit:  3/24/24  Plan of care signed (Y/N):  Y  Outcome Measure:   Eval:  LEFS - 45% disability  24  LEFS = 25% disability  Six min walk = 377' in 3'08\", no AD    Visit# / total visits:        Pain level: 2-3/10   Goals:     Patient goals: Walk w/o difficulty or pain  Short term goals  Time Frame for Short term goals: Defer to LTGs     Long Term Goals  Time Frame for Long Term Goals: In 4 weeks, patient will  demonstrate compliance and independence w/HEP to advance mobility and strength for daily activities. - Reports compliance/ONGOING 24  score at <= 25% disability on  LEFS as indication of improved function w/daily activities. = MET W/ROOM FOR IMPROVEMENT 24  improve knee AROM to 0-120 for functional range w/MRADLs, improved ease of mobility.   improve knee strength to >= 4/5 for basic functional strength required for safe mobility w/o use of AD.  Ambulate >= 1,000 ft in 6', no AD, normalized gait for community gait/safety.      Summary of Evaluation:  Patient is a 58 y.o. female s/p R TKA.Pt reports having chronic R knee pain for years. She also reports

## 2024-03-06 ENCOUNTER — HOSPITAL ENCOUNTER (OUTPATIENT)
Dept: PHYSICAL THERAPY | Age: 59
Setting detail: THERAPIES SERIES
Discharge: HOME | End: 2024-03-06
Payer: COMMERCIAL

## 2024-03-06 PROCEDURE — 97016 VASOPNEUMATIC DEVICE THERAPY: CPT

## 2024-03-06 PROCEDURE — 97112 NEUROMUSCULAR REEDUCATION: CPT

## 2024-03-06 PROCEDURE — 97110 THERAPEUTIC EXERCISES: CPT

## 2024-03-06 PROCEDURE — 97140 MANUAL THERAPY 1/> REGIONS: CPT

## 2024-03-06 NOTE — FLOWSHEET NOTE
strap 10x 10\"     SLR 20x with QS initiation 1x20    LAQ 20x 3\" 1x20 3\" hold    HSS   3 x 30\"  Vc/tc   fitter      Hip flex stretch/leg hang      Manual therapy  See below See below   STANDING - progress as fredrick      Shuttle   2 x 10 2C  BLE  2 x 10 1C  RLE only   HC stretch   3 x 30\"  Vc/demo   Gait Review proper gait with cane to improve alignment; WS and step thru on RLE     High marching in // bars      Mint squats in // bars      Hamstring curls in //bars      Ant step ups      Lat step ups      Gait training  2x30' each* No AD  150 ft x 2  Vc hip/knee ext  Heel strike/toe off   Sit><stand   1 x 5 from 21\" mat  R foot back, L forward  No hands   Sled   2 x 50 ft  No addt'l wt  Vc/demo  Encouraged full knee ext   Retro-gait   1 x 30 ft  Supervision  Slow wendy  Vc terminal knee ext   PROPRIOCEPTION      Wobble board       Cone taps      BOSU lunge   1 x 10 //bars  BLEs  Light touch               MODALITIES      VASO            *= marching, butt kicks (kicking sand), and retro walking    Other Therapeutic Activities/Education:    1/24/24:  POC and treatment rationale/progression expected reviewed with patient.  Recommended patient be sure to take pain medication prior to treatment as pain was limiting us today.    Home Exercise Program:    Access Code: [email or web address]  URL: [email or web address]  Date: 01/23/2024  Prepared by: Nicolette Da Silva    Exercises  - Supine Ankle Pumps  - 2 x daily - 7 x weekly - 2 sets - 10 reps  - Quad Setting and Stretching  - 2 x daily - 7 x weekly - 2 sets - 10 reps  - Supine Knee Extension Strengthening  - 2 x daily - 7 x weekly - 2 sets - 10 reps  - Supine Heel Slide  - 2 x daily - 7 x weekly - 2 sets - 10 reps  - Supine Heel Slide with Strap  - 2 x daily - 7 x weekly - 2 sets - 5 reps  - Active Straight Leg Raise with Quad Set  - 2 x daily - 7 x weekly - 2 sets - 10 reps  - Supine Hip Abduction  - 2 x daily - 7 x weekly - 2 sets - 10 reps  - Seated Long Arc Quad  - 2 x daily - 7

## 2024-03-12 ENCOUNTER — HOSPITAL ENCOUNTER (OUTPATIENT)
Dept: PHYSICAL THERAPY | Age: 59
Setting detail: THERAPIES SERIES
Discharge: HOME | End: 2024-03-12
Payer: COMMERCIAL

## 2024-03-12 PROCEDURE — 97016 VASOPNEUMATIC DEVICE THERAPY: CPT

## 2024-03-12 PROCEDURE — 97140 MANUAL THERAPY 1/> REGIONS: CPT

## 2024-03-12 PROCEDURE — 97110 THERAPEUTIC EXERCISES: CPT

## 2024-03-12 PROCEDURE — 97530 THERAPEUTIC ACTIVITIES: CPT

## 2024-03-12 NOTE — FLOWSHEET NOTE
forward  No hands    Sled  2 x 50 ft  No addt'l wt  Vc/demo  Encouraged full knee ext    Retro-gait  1 x 30 ft  Supervision  Slow wendy  Vc terminal knee ext    PROPRIOCEPTION      Wobble board       Cone taps      BOSU lunge  1 x 10 //bars  BLEs  Light touch                MODALITIES      VASO            *= marching, butt kicks (kicking sand), and retro walking    Other Therapeutic Activities/Education:    1/24/24:  POC and treatment rationale/progression expected reviewed with patient.  Recommended patient be sure to take pain medication prior to treatment as pain was limiting us today.    Home Exercise Program:    Access Code: [email or web address]  URL: [email or web address]  Date: 01/23/2024  Prepared by: Nicolette Da Silva    Exercises  - Supine Ankle Pumps  - 2 x daily - 7 x weekly - 2 sets - 10 reps  - Quad Setting and Stretching  - 2 x daily - 7 x weekly - 2 sets - 10 reps  - Supine Knee Extension Strengthening  - 2 x daily - 7 x weekly - 2 sets - 10 reps  - Supine Heel Slide  - 2 x daily - 7 x weekly - 2 sets - 10 reps  - Supine Heel Slide with Strap  - 2 x daily - 7 x weekly - 2 sets - 5 reps  - Active Straight Leg Raise with Quad Set  - 2 x daily - 7 x weekly - 2 sets - 10 reps  - Supine Hip Abduction  - 2 x daily - 7 x weekly - 2 sets - 10 reps  - Seated Long Arc Quad  - 2 x daily - 7 x weekly - 2 sets - 10 reps  - Seated Hamstring Stretch  - 2-3 x daily - 7 x weekly - 1 sets - 3 reps  - Standing Ankle Dorsiflexion Stretch  - 2-3 x daily - 7 x weekly - 1 sets - 3 reps    Access Code: [email or web address]  URL: [email or web address]  Date: 01/30/2024  Prepared by: Nicolette Da Silva    Exercises  - Seated Hamstring Stretch  - 3 x daily - 7 x weekly - 1 sets - 3 reps  - Seated Knee Flexion AAROM  - 3 x daily - 7 x weekly - 1 sets - 10 reps  Access Code: [email or web address]  URL: [email or web address]  Date: 02/21/2024  Prepared by: Nicolette Da Silva    Exercises  - Standing Gastroc Stretch  - 1-2 x daily - 7 x weekly - 2 sets

## 2024-03-14 ENCOUNTER — HOSPITAL ENCOUNTER (OUTPATIENT)
Dept: PHYSICAL THERAPY | Age: 59
Setting detail: THERAPIES SERIES
Discharge: HOME | End: 2024-03-14
Payer: COMMERCIAL

## 2024-03-14 PROCEDURE — 97110 THERAPEUTIC EXERCISES: CPT

## 2024-03-14 PROCEDURE — 97016 VASOPNEUMATIC DEVICE THERAPY: CPT

## 2024-03-14 PROCEDURE — 97140 MANUAL THERAPY 1/> REGIONS: CPT

## 2024-03-14 NOTE — FLOWSHEET NOTE
.       Outpatient Physical Therapy  [place]           [x] Phone: [phone]   Fax: [phone]  [place]           [] Phone: [phone]   Fax: [phone]        Physical Therapy Daily Treatment Note  Date:  3/14/2024    Patient Name:  Martha Burns    :  1965  MRN: [id number]  Restrictions/Precautions: No data recorded   Position Activity Restriction  Other position/activity restrictions: No formal restrictions  Diagnosis:   S/P total knee arthroplasty, right [Z96.651] Diagnosis: R TKA  Date of Injury/Surgery: 24  Treatment Diagnosis:  R knee pain, impaired ROM and strength, antaligic gait, pain  Insurance/Certification information:  Pt approved for 50 units of each [id number]  [id number]  [id number]  [id number]  [id number]  [id number]  07439   24-24  Referring Physician:  Amilcar Esteves DO     PCP: Vahe Zamudio MD  Next Doctor Visit:  3/24/24  Plan of care signed (Y/N):  Y  Outcome Measure:   Eval:  LEFS - 45% disability  24  LEFS = 25% disability  Six min walk = 377' in 3'08\", no AD  Visit# / total visits:        Pain level: 4/10   Goals:        Patient goals: Walk w/o difficulty or pain  Short term goals  Time Frame for Short term goals: Defer to LTGs  Long Term Goals  Time Frame for Long Term Goals: In 4 weeks, patient will  demonstrate compliance and independence w/HEP to advance mobility and strength for daily activities. - Reports compliance/ONGOING 24  score at <= 25% disability on  LEFS as indication of improved function w/daily activities. = MET W/ROOM FOR IMPROVEMENT 24  improve knee AROM to 0-120 for functional range w/MRADLs, improved ease of mobility.   improve knee strength to >= 4/5 for basic functional strength required for safe mobility w/o use of AD. - IMPROVED/UNMET 24  Ambulate >= 1,000 ft in 6', no AD, normalized gait for community gait/safety. - UNMET 24       Summary of Evaluation:  Patient is a 58 y.o. female s/p R TKA.Pt reports having chronic R

## 2024-03-18 ENCOUNTER — HOSPITAL ENCOUNTER (OUTPATIENT)
Dept: PHYSICAL THERAPY | Age: 59
Setting detail: THERAPIES SERIES
Discharge: HOME | End: 2024-03-18
Payer: COMMERCIAL

## 2024-03-18 PROCEDURE — 97110 THERAPEUTIC EXERCISES: CPT

## 2024-03-18 PROCEDURE — 97140 MANUAL THERAPY 1/> REGIONS: CPT

## 2024-03-18 NOTE — FLOWSHEET NOTE
.       Outpatient Physical Therapy  [place]           [x] Phone: [phone]   Fax: [phone]  [place]           [] Phone: [phone]   Fax: [phone]        Physical Therapy Daily Treatment Note  Date:  3/18/2024    Patient Name:  Martha Burns    :  1965  MRN: [id number]  Restrictions/Precautions: No data recorded   Position Activity Restriction  Other position/activity restrictions: No formal restrictions  Diagnosis:   S/P total knee arthroplasty, right [Z96.651] Diagnosis: R TKA  Date of Injury/Surgery: 24  Treatment Diagnosis:  R knee pain, impaired ROM and strength, antaligic gait, pain  Insurance/Certification information:  Pt approved for 50 units of each [id number]  [id number]  [id number]  [id number]  [id number]  [id number]  [id number]   24-24  Referring Physician:  Amilcar Esteves DO     PCP: Vahe Zamudio MD  Next Doctor Visit:  3/24/24  Plan of care signed (Y/N):  Y  Outcome Measure:   Eval:  LEFS - 45% disability  24  LEFS = 25% disability  Six min walk = 377' in 3'08\", no AD  Visit# / total visits:        Pain level: 0/10   Goals:        Patient goals: Walk w/o difficulty or pain  Short term goals  Time Frame for Short term goals: Defer to LTGs  Long Term Goals  Time Frame for Long Term Goals: In 4 weeks, patient will  demonstrate compliance and independence w/HEP to advance mobility and strength for daily activities. - Reports compliance/ONGOING 24  score at <= 25% disability on  LEFS as indication of improved function w/daily activities. = MET W/ROOM FOR IMPROVEMENT 24  improve knee AROM to 0-120 for functional range w/MRADLs, improved ease of mobility.   improve knee strength to >= 4/5 for basic functional strength required for safe mobility w/o use of AD. - IMPROVED/UNMET 24  Ambulate >= 1,000 ft in 6', no AD, normalized gait for community gait/safety. - UNMET 24       Summary of Evaluation:  Patient is a 58 y.o. female s/p R TKA.Pt reports having chronic R

## 2024-03-20 ENCOUNTER — HOSPITAL ENCOUNTER (OUTPATIENT)
Dept: PHYSICAL THERAPY | Age: 59
Discharge: HOME | End: 2024-03-20

## 2024-03-25 ENCOUNTER — HOSPITAL ENCOUNTER (OUTPATIENT)
Dept: PHYSICAL THERAPY | Age: 59
Setting detail: THERAPIES SERIES
Discharge: HOME | End: 2024-03-25
Payer: COMMERCIAL

## 2024-03-25 PROCEDURE — 97110 THERAPEUTIC EXERCISES: CPT

## 2024-03-25 PROCEDURE — 97140 MANUAL THERAPY 1/> REGIONS: CPT

## 2024-03-25 NOTE — FLOWSHEET NOTE
stretch       Gait       High marching in // bars  High march no UE support 10x2 High march no UE support 10x2    Mint squats in // bars       Hamstring curls in //bars       Ant step ups 6\" x10 6\" needed heavy UE support for pulling up  4\" 10 x 2 with no UE and CGA of PTA 4\" 2x10 4\" step 2x10   Hip abd  10 x 2 ea LE no UE support 10 x 2 ea LE no UE support    Lunges    To 4\" step 2x10   HR  10 x 2 no UE support 2x10    Lat step ups 4\" x10 4\" 10 x 2 no UE support and CGA of PTA 4\" 2x10 4\" 2x10   Flex onto step X10, 5\"      Gait training       Sit><stand       Sled       Retro-gait       PROPRIOCEPTION       Wobble board        Cone taps       BOSU lunge                     MODALITIES       VASO  10'  Pt declined, will ice later at home           *= marching, butt kicks (kicking sand), and retro walking    Other Therapeutic Activities/Education:    1/24/24:  POC and treatment rationale/progression expected reviewed with patient.  Recommended patient be sure to take pain medication prior to treatment as pain was limiting us today.    Home Exercise Program:    Access Code: [email or web address]  URL: [email or web address]  Date: 01/23/2024  Prepared by: Nicolette Da Silva    Exercises  - Supine Ankle Pumps  - 2 x daily - 7 x weekly - 2 sets - 10 reps  - Quad Setting and Stretching  - 2 x daily - 7 x weekly - 2 sets - 10 reps  - Supine Knee Extension Strengthening  - 2 x daily - 7 x weekly - 2 sets - 10 reps  - Supine Heel Slide  - 2 x daily - 7 x weekly - 2 sets - 10 reps  - Supine Heel Slide with Strap  - 2 x daily - 7 x weekly - 2 sets - 5 reps  - Active Straight Leg Raise with Quad Set  - 2 x daily - 7 x weekly - 2 sets - 10 reps  - Supine Hip Abduction  - 2 x daily - 7 x weekly - 2 sets - 10 reps  - Seated Long Arc Quad  - 2 x daily - 7 x weekly - 2 sets - 10 reps  - Seated Hamstring Stretch  - 2-3 x daily - 7 x weekly - 1 sets - 3 reps  - Standing Ankle Dorsiflexion Stretch  - 2-3 x daily - 7 x weekly - 1 sets - 3

## 2024-03-27 ENCOUNTER — OFFICE VISIT (OUTPATIENT)
Dept: ORTHOPEDIC SURGERY | Age: 59
End: 2024-03-27
Payer: COMMERCIAL

## 2024-03-27 ENCOUNTER — TELEPHONE (OUTPATIENT)
Dept: ORTHOPEDIC SURGERY | Age: 59
End: 2024-03-27

## 2024-03-27 ENCOUNTER — HOSPITAL ENCOUNTER (OUTPATIENT)
Dept: PHYSICAL THERAPY | Age: 59
Setting detail: THERAPIES SERIES
Discharge: HOME | End: 2024-03-27
Payer: COMMERCIAL

## 2024-03-27 VITALS
BODY MASS INDEX: 30.36 KG/M2 | HEART RATE: 82 BPM | HEIGHT: 62 IN | RESPIRATION RATE: 14 BRPM | OXYGEN SATURATION: 97 % | WEIGHT: 165 LBS

## 2024-03-27 DIAGNOSIS — M24.561 CONTRACTURE OF RIGHT KNEE: Primary | ICD-10-CM

## 2024-03-27 DIAGNOSIS — M17.12 PRIMARY OSTEOARTHRITIS OF LEFT KNEE: ICD-10-CM

## 2024-03-27 DIAGNOSIS — Z96.651 S/P TOTAL KNEE ARTHROPLASTY, RIGHT: Primary | ICD-10-CM

## 2024-03-27 PROCEDURE — 90000 NO LOS: CPT | Performed by: PHYSICIAN ASSISTANT

## 2024-03-27 PROCEDURE — 97530 THERAPEUTIC ACTIVITIES: CPT

## 2024-03-27 PROCEDURE — 97116 GAIT TRAINING THERAPY: CPT

## 2024-03-27 PROCEDURE — 97110 THERAPEUTIC EXERCISES: CPT

## 2024-03-27 PROCEDURE — 20610 DRAIN/INJ JOINT/BURSA W/O US: CPT | Performed by: PHYSICIAN ASSISTANT

## 2024-03-27 RX ORDER — TRIAMCINOLONE ACETONIDE 40 MG/ML
40 INJECTION, SUSPENSION INTRA-ARTICULAR; INTRAMUSCULAR ONCE
Status: COMPLETED | OUTPATIENT
Start: 2024-03-27 | End: 2024-03-27

## 2024-03-27 RX ADMIN — TRIAMCINOLONE ACETONIDE 40 MG: 40 INJECTION, SUSPENSION INTRA-ARTICULAR; INTRAMUSCULAR at 09:33

## 2024-03-27 NOTE — PATIENT INSTRUCTIONS
Continue weight-bearing as tolerated.  Continue range of motion exercises as instructed.  Ice and elevate as needed.  Tylenol or Motrin for pain.  Injection given into the left knee.  Follow up in office after a right knee CHARLES    We are committed to providing you the best care possible.  If you receive a survey after visiting one of our offices, please take time to share your experience concerning your physician office visit.  These surveys are confidential and no health information about you is shared.  We are eager to improve for you and we are counting on your feedback to help make that happen.

## 2024-03-27 NOTE — FLOWSHEET NOTE
daily - 7 x weekly - 2 sets - 10 reps  - Seated Long Arc Quad  - 2 x daily - 7 x weekly - 2 sets - 10 reps  - Seated Hamstring Stretch  - 2-3 x daily - 7 x weekly - 1 sets - 3 reps  - Standing Ankle Dorsiflexion Stretch  - 2-3 x daily - 7 x weekly - 1 sets - 3 reps    Access Code: [email or web address]  URL: [email or web address]  Date: 01/30/2024  Prepared by: Nicolette Da Silva    Exercises  - Seated Hamstring Stretch  - 3 x daily - 7 x weekly - 1 sets - 3 reps  - Seated Knee Flexion AAROM  - 3 x daily - 7 x weekly - 1 sets - 10 reps  Access Code: [email or web address]  URL: [email or web address]  Date: 02/21/2024  Prepared by: Nicolette Da Silva    Exercises  - Standing Gastroc Stretch  - 1-2 x daily - 7 x weekly - 2 sets - 10 reps - 30 hold  - Hip Flexor Stretch at Edge of Bed  - 1-2 x daily - 7 x weekly - 1 sets - 3 reps - 30 hold  Added knee ext stretching    Manual Treatments: NO     Modalities:NO     Communication with other providers:  POC faxed 1/23, PN 2/21, PN 3/27    Assessment:    End pain 3/10    Pt has completed 14 physical therapy sessions s/p R TKA.  LEFS = 19% disability - improved  Six min walk = 582' in 4 min, no AD - improved  Strength = NC since previous PN  ROM = modest improvement  Pt is scheduled for a knee manipulation on 4/09.  Until then, we will continue progressing ROM and strength, balance/gait/proprioception and manage pain.      Plan for Next Session:   KNEE A/P JOINT MOBILIZATION  HIP FLEXOR, HAMSTRING, HC STRETCHING  GAIT TRAINING/NRE   VASO  Specific Instructions for Next Treatment: Advance per typical TKA protocol.  Eventual gait w/o  AD.    Time In / Time Out:  1020/1100    If [employer] Please Indicate Units for Rx this date and running total for each and overall total for Rx to date:  (No billed tx allowed day of eval)    50 units of each [id number]  [id number]  [id number]  [id number]  82327  12959  56365    CPT Code Units today Running Total Units Total approved    TE [id number]  1 19 50   MAN 84636   10 50   Gait

## 2024-03-27 NOTE — PROGRESS NOTES
Physical Therapy      Outpatient Physical Therapy           [place]           [] Phone: [phone]   Fax: [phone]  [place]           [] Phone: [phone]   Fax: [phone]      To: Amilcar Esteves DO     From: Nicolette Da Silva, KETURAH  Patient: Martha Burns                    : 1965  Diagnosis:  S/P total knee arthroplasty, right [Z96.651]        Treatment Diagnosis:    R knee pain, impaired ROM and strength, antaligic gait, pain    Date: 3/27/2024  [x]  Progress Note                []  Discharge Note    Evaluation Date:  24   Total Visits to date:   14 Cancels/No-shows to date:  2    Subjective:    Pt reports she is using the cane a little less.  Mobility slightly improved.  Martha states she is scheduled for a right knee manipulation on .    Plan of Care/Treatment to date:  [x] Therapeutic Exercise    [x] Modalities:  [x] Therapeutic Activity     [] Ultrasound  [x] Electrical Stimulation  [x] Gait Training      [] Cervical Traction   [] Lumbar Traction  [x] Neuromuscular Re-education  [x] Cold/hotpack [] Iontophoresis  [x] Instruction in HEP      Other:  [x] Manual Therapy       [x]  Vasopneumatic  [] Aquatic Therapy       []   Dry Needle Therapy                      Objective/Significant Findings At Last Visit/Comments:    Mildly  antalgic gait on arrival, spc  Mild to mod antalgic gait during 6 trudy walk no AD     R knee AA/PROM  20-96 deg     R knee AROM  24-90     MMT RLE   Hip flex 4-/5  Hip ext 4-/5  Hip abd 4-/5  Hip IR 3-/5  Hip ER 4-/5  Knee flex/ext 4/5       Assessment:     Pt has completed 14 physical therapy sessions s/p R TKA.  LEFS = 19% disability - improved  Six min walk = 582' in 4 min, no AD - improved  Strength = NC since previous PN  ROM = modest improvement  Pt is scheduled for a knee manipulation on .  Until then, we will continue progressing ROM and strength, balance/gait/proprioception and manage pain.    Goal Status:  [] Achieved [x] Partially

## 2024-03-27 NOTE — TELEPHONE ENCOUNTER
Patient scheduled for    Manipulation Under Anesthesia of Right Knee  Date: 4/9/24  Facility: [facility]  Surgeon: Amilcar Esteves, DO    Surgery Request created/emailed 3/27/24  PCP Clearance received from RT TKA    Pre Op Appt 2/12/24    [facility]  Contacted 3/27/24 via [place] with clinicals uploaded  Status: Approved CPT 96002 ICD M24.561 for outpatient  Auth# [id number]  Date Span: 4/9/24-5/9/24    Phone PAT

## 2024-03-27 NOTE — PROGRESS NOTES
Date of surgery:   1/4/2024  Surgeon: Dr. Esteves    History:  Ms. Martha Burns is here in follow up regarding her right total knee arthroplasty.  She states that she continues to work with physical therapy and is having a hard time getting all of her flexion that she is supposed to get.  She is also having left knee pain from an arthritic left knee and she thinks that might be inhibiting her on her therapy and walking.  Physical:   Vitals:    03/27/24 0922   Pulse: 82   Resp: 14   SpO2: 97%       Range of motion of the right knee shows passive extension to 0 degrees but active extension to approximately 10 degrees short of full extension.  Flexion: 100 degrees.  No varus or valgus instability noted.  Left knee:  Moderate valgus alignment, range of motion 5-120 degrees    Imaging studies:  3 views of the right knee taken reviewed in the office today show right total knee arthroplasty in good position with no signs of loosening or periprosthetic fracture.  Patient does have a left knee that is seen on the AP view which shows severe bone-on-bone articulation the lateral compartment and valgus alignment.    Impression: Status post above, doing well       Plan:   I explained to the patient today that her range of motion is not exactly where we would like it to be and being almost 3 months out from her surgery I think she needs to consider manipulation under anesthesia therefore I will get her scheduled for that and she will follow-up for that procedure with Dr. Esteves.  I did give her an injection in the left knee today.  Patient Instructions   Continue weight-bearing as tolerated.  Continue range of motion exercises as instructed.  Ice and elevate as needed.  Tylenol or Motrin for pain.  Injection given into the left knee.  Follow up in office after a right knee CHARLES    We are committed to providing you the best care possible.  If you receive a survey after visiting one of our offices, please take time to share

## 2024-03-29 RX ORDER — SODIUM CHLORIDE, SODIUM LACTATE, POTASSIUM CHLORIDE, CALCIUM CHLORIDE 600; 310; 30; 20 MG/100ML; MG/100ML; MG/100ML; MG/100ML
INJECTION, SOLUTION INTRAVENOUS CONTINUOUS
Status: CANCELLED | OUTPATIENT
Start: 2024-04-09

## 2024-04-01 NOTE — PROGRESS NOTES
4/1/24 - .LM with my call-back # concerning  surgery @ [facility] on  4/9/24.  Please call the PAT Nurse for a phone assessment and surgery instructions.

## 2024-04-02 ENCOUNTER — ANESTHESIA EVENT (OUTPATIENT)
Dept: OPERATING ROOM | Age: 59
End: 2024-04-02
Payer: COMMERCIAL

## 2024-04-02 NOTE — ANESTHESIA PRE PROCEDURE
and risks discussed with patient.      Plan discussed with CRNA.    Attending anesthesiologist reviewed and agrees with Preprocedure content              BEN Morris - CRNA   4/2/2024

## 2024-04-03 RX ORDER — ASPIRIN 81 MG/1
81 TABLET ORAL DAILY
COMMUNITY

## 2024-04-03 NOTE — PROGRESS NOTES
.Surgery @ [facility] on 4/9/24 you will be called 4/8/24 with times    NOTHING TO EAT OR DRINK AFTER MIDNIGHT DAY OF SURGERY    1. Enter thru the hospital main entrance on day of surgery, check in at the Information Desk. If you arrive prior to 6:00am, enter thru the ER entrance.    2. Follow the directions as prescribed by the doctor for your procedure and medications.         Morning of surgery take:  No medications         Stop vitamins, supplements and NSAIDS:  Today         Hold Glipizide & Actos: 4/8/24 & 4/9/24         Last dose Mounjaro: 3/27/24    3. Check with your Doctor regarding stopping blood thinners and follow their instructions.    4. Do not smoke, vape or use chewing tobacco morning of surgery. Do not drink any alcoholic beverages 24 hours prior to surgery.       This includes NA Beer. No street drugs 7 days prior to surgery.    5. If you have dentures, contacts of glasses they will be removed before going to the OR; please bring a case.    6. Please bring picture ID, insurance card, paperwork from the doctor’s office (H & P, Consent, & card for implantable devices).    7. Take a shower with an antibacterial soap the night before surgery and the morning of surgery. Do not put anything on your skin      After your morning shower.    8. You will need a responsible adult to drive you home and check on you after surgery.

## 2024-04-08 NOTE — PROGRESS NOTES
4/8/24 - Patient called stating she was diagnosed today with a UTI. I advised her to call the surgeon's office and update them. Martha verbalized understanding.  Office updated via TEAMS.

## 2024-04-08 NOTE — PROGRESS NOTES
4/8/24 - .Notified patient surgery @ [facility] on  4/9/24 @ 0730, arrival 0600. NPO status and morning medications reviewed. Understanding verbalized.

## 2024-04-09 ENCOUNTER — HOSPITAL ENCOUNTER (OUTPATIENT)
Dept: PHYSICAL THERAPY | Age: 59
Setting detail: THERAPIES SERIES
Discharge: HOME | End: 2024-04-09
Payer: COMMERCIAL

## 2024-04-09 ENCOUNTER — ANESTHESIA (OUTPATIENT)
Dept: OPERATING ROOM | Age: 59
End: 2024-04-09
Payer: COMMERCIAL

## 2024-04-09 ENCOUNTER — HOSPITAL ENCOUNTER (OUTPATIENT)
Age: 59
Setting detail: OUTPATIENT SURGERY
Discharge: HOME | End: 2024-04-09
Attending: ORTHOPAEDIC SURGERY | Admitting: ORTHOPAEDIC SURGERY
Payer: COMMERCIAL

## 2024-04-09 VITALS
DIASTOLIC BLOOD PRESSURE: 81 MMHG | OXYGEN SATURATION: 99 % | BODY MASS INDEX: 30.18 KG/M2 | HEIGHT: 62 IN | WEIGHT: 164 LBS | SYSTOLIC BLOOD PRESSURE: 150 MMHG | RESPIRATION RATE: 18 BRPM | HEART RATE: 79 BPM | TEMPERATURE: 97.7 F

## 2024-04-09 DIAGNOSIS — Z96.651 HISTORY OF TOTAL RIGHT KNEE REPLACEMENT: Primary | ICD-10-CM

## 2024-04-09 LAB — GLUCOSE BLD-MCNC: 148 MG/DL (ref 70–99)

## 2024-04-09 PROCEDURE — 7100000010 HC PHASE II RECOVERY - FIRST 15 MIN: Performed by: ORTHOPAEDIC SURGERY

## 2024-04-09 PROCEDURE — 3700000000 HC ANESTHESIA ATTENDED CARE: Performed by: ORTHOPAEDIC SURGERY

## 2024-04-09 PROCEDURE — 6360000002 HC RX W HCPCS: Performed by: NURSE ANESTHETIST, CERTIFIED REGISTERED

## 2024-04-09 PROCEDURE — 6370000000 HC RX 637 (ALT 250 FOR IP): Performed by: ORTHOPAEDIC SURGERY

## 2024-04-09 PROCEDURE — 3600000002 HC SURGERY LEVEL 2 BASE: Performed by: ORTHOPAEDIC SURGERY

## 2024-04-09 PROCEDURE — 27570 FIXATION OF KNEE JOINT: CPT | Performed by: ORTHOPAEDIC SURGERY

## 2024-04-09 PROCEDURE — 82962 GLUCOSE BLOOD TEST: CPT

## 2024-04-09 PROCEDURE — 7100000011 HC PHASE II RECOVERY - ADDTL 15 MIN: Performed by: ORTHOPAEDIC SURGERY

## 2024-04-09 PROCEDURE — 2580000003 HC RX 258: Performed by: ORTHOPAEDIC SURGERY

## 2024-04-09 PROCEDURE — 97161 PT EVAL LOW COMPLEX 20 MIN: CPT

## 2024-04-09 PROCEDURE — 2709999900 HC NON-CHARGEABLE SUPPLY: Performed by: ORTHOPAEDIC SURGERY

## 2024-04-09 RX ORDER — ACETAMINOPHEN 500 MG
1000 TABLET ORAL ONCE
Status: COMPLETED | OUTPATIENT
Start: 2024-04-09 | End: 2024-04-09

## 2024-04-09 RX ORDER — SODIUM CHLORIDE 0.9 % (FLUSH) 0.9 %
5-40 SYRINGE (ML) INJECTION EVERY 12 HOURS SCHEDULED
Status: CANCELLED | OUTPATIENT
Start: 2024-04-09

## 2024-04-09 RX ORDER — LIDOCAINE HYDROCHLORIDE 20 MG/ML
INJECTION, SOLUTION INTRAVENOUS PRN
Status: DISCONTINUED | OUTPATIENT
Start: 2024-04-09 | End: 2024-04-09 | Stop reason: SDUPTHER

## 2024-04-09 RX ORDER — SODIUM CHLORIDE, SODIUM LACTATE, POTASSIUM CHLORIDE, CALCIUM CHLORIDE 600; 310; 30; 20 MG/100ML; MG/100ML; MG/100ML; MG/100ML
INJECTION, SOLUTION INTRAVENOUS CONTINUOUS
Status: CANCELLED | OUTPATIENT
Start: 2024-04-09

## 2024-04-09 RX ORDER — ONDANSETRON 4 MG/1
4 TABLET, ORALLY DISINTEGRATING ORAL EVERY 8 HOURS PRN
Status: CANCELLED | OUTPATIENT
Start: 2024-04-09

## 2024-04-09 RX ORDER — PROPOFOL 10 MG/ML
INJECTION, EMULSION INTRAVENOUS PRN
Status: DISCONTINUED | OUTPATIENT
Start: 2024-04-09 | End: 2024-04-09 | Stop reason: SDUPTHER

## 2024-04-09 RX ORDER — SODIUM CHLORIDE 0.9 % (FLUSH) 0.9 %
5-40 SYRINGE (ML) INJECTION PRN
Status: CANCELLED | OUTPATIENT
Start: 2024-04-09

## 2024-04-09 RX ORDER — OXYCODONE HYDROCHLORIDE 5 MG/1
10 TABLET ORAL EVERY 4 HOURS PRN
Status: CANCELLED | OUTPATIENT
Start: 2024-04-09

## 2024-04-09 RX ORDER — SODIUM CHLORIDE 0.9 % (FLUSH) 0.9 %
5-40 SYRINGE (ML) INJECTION PRN
Status: DISCONTINUED | OUTPATIENT
Start: 2024-04-09 | End: 2024-04-09 | Stop reason: HOSPADM

## 2024-04-09 RX ORDER — SODIUM CHLORIDE 9 MG/ML
INJECTION, SOLUTION INTRAVENOUS PRN
Status: CANCELLED | OUTPATIENT
Start: 2024-04-09

## 2024-04-09 RX ORDER — HYDROCODONE BITARTRATE AND ACETAMINOPHEN 5; 325 MG/1; MG/1
1 TABLET ORAL EVERY 6 HOURS PRN
Qty: 18 TABLET | Refills: 0 | Status: SHIPPED | OUTPATIENT
Start: 2024-04-09 | End: 2024-04-16

## 2024-04-09 RX ORDER — ONDANSETRON 2 MG/ML
4 INJECTION INTRAMUSCULAR; INTRAVENOUS EVERY 6 HOURS PRN
Status: CANCELLED | OUTPATIENT
Start: 2024-04-09

## 2024-04-09 RX ORDER — NALOXONE HYDROCHLORIDE 0.4 MG/ML
INJECTION, SOLUTION INTRAMUSCULAR; INTRAVENOUS; SUBCUTANEOUS PRN
Status: CANCELLED | OUTPATIENT
Start: 2024-04-09

## 2024-04-09 RX ORDER — KETOROLAC TROMETHAMINE 30 MG/ML
30 INJECTION, SOLUTION INTRAMUSCULAR; INTRAVENOUS EVERY 6 HOURS
Status: CANCELLED | OUTPATIENT
Start: 2024-04-09 | End: 2024-04-12

## 2024-04-09 RX ORDER — OXYCODONE HYDROCHLORIDE 5 MG/1
5 TABLET ORAL EVERY 4 HOURS PRN
Status: CANCELLED | OUTPATIENT
Start: 2024-04-09

## 2024-04-09 RX ORDER — SODIUM CHLORIDE 9 MG/ML
INJECTION, SOLUTION INTRAVENOUS PRN
Status: DISCONTINUED | OUTPATIENT
Start: 2024-04-09 | End: 2024-04-09 | Stop reason: HOSPADM

## 2024-04-09 RX ORDER — SODIUM CHLORIDE, SODIUM LACTATE, POTASSIUM CHLORIDE, CALCIUM CHLORIDE 600; 310; 30; 20 MG/100ML; MG/100ML; MG/100ML; MG/100ML
INJECTION, SOLUTION INTRAVENOUS CONTINUOUS
Status: DISCONTINUED | OUTPATIENT
Start: 2024-04-09 | End: 2024-04-09 | Stop reason: HOSPADM

## 2024-04-09 RX ORDER — SODIUM CHLORIDE 0.9 % (FLUSH) 0.9 %
5-40 SYRINGE (ML) INJECTION EVERY 12 HOURS SCHEDULED
Status: DISCONTINUED | OUTPATIENT
Start: 2024-04-09 | End: 2024-04-09 | Stop reason: HOSPADM

## 2024-04-09 RX ADMIN — LIDOCAINE HYDROCHLORIDE 50 MG: 20 INJECTION, SOLUTION INTRAVENOUS at 07:35

## 2024-04-09 RX ADMIN — PROPOFOL 80 MG: 10 INJECTION, EMULSION INTRAVENOUS at 07:35

## 2024-04-09 RX ADMIN — SODIUM CHLORIDE, POTASSIUM CHLORIDE, SODIUM LACTATE AND CALCIUM CHLORIDE: 600; 310; 30; 20 INJECTION, SOLUTION INTRAVENOUS at 07:04

## 2024-04-09 RX ADMIN — ACETAMINOPHEN 1000 MG: 500 TABLET ORAL at 07:06

## 2024-04-09 ASSESSMENT — PAIN - FUNCTIONAL ASSESSMENT
PAIN_FUNCTIONAL_ASSESSMENT: 0-10
PAIN_FUNCTIONAL_ASSESSMENT: ACTIVITIES ARE NOT PREVENTED
PAIN_FUNCTIONAL_ASSESSMENT: 0-10

## 2024-04-09 ASSESSMENT — PAIN DESCRIPTION - DESCRIPTORS: DESCRIPTORS: ACHING

## 2024-04-09 NOTE — PLAN OF CARE
Outpatient Physical Therapy           [place]           [] Phone: [phone]   Fax: [phone]  [place]           [] Phone: [phone]   Fax: [phone]     To: Amilcar Esteves,      From: Dustin Goel, PT, DPT, OCS      Patient: Martha Burns       : 1965  Diagnosis: S/P total knee arthroplasty, right [Z96.651]    CHARLES 24.  Treatment Diagnosis:   ROM deficits, weakness, gait dysfunction   Date: 2024    Physical Therapy Certification/Re-Certification Form  Dear Dr. Esteves   The following patient has been evaluated for physical therapy services and for therapy to continue, insurance requires physician review of the treatment plan initially and every 90 days. Please review the attached evaluation and/or summary of the patient's plan of care, and verify that you agree therapy should continue by signing the attached document and sending it back to our office.    Assessment:      Patient is a 58 y.o. female s/p R TKA  24. Started 2 weeks after therapy with completion of 14 visits with limited progress. Dr. Esteves recommended CHARLES. Completed 24. Doing well functionally at home with compensation with transfers. Return follow up on 24. No other concerns. Min numbness at surrounding patella. Patient received education on their current pathology and how their condition effects them with their functional activities. Patient understood discussion and questions were answered. Patient understands their activity limitations and understands rational for treatment progression.         Plan of Care/Treatment to date:  [x] Therapeutic Exercise  [] Modalities:  [x] Therapeutic Activity     [] Ultrasound  [] Electrical Stimulation  [x] Gait Training      [] Cervical Traction [] Lumbar Traction  [x] Neuromuscular Re-education    [] Cold/hotpack [] Iontophoresis   [x] Instruction in HEP      [] Vasopneumatic    [] Dry Needling  [] Manual Therapy               [] Aquatic Therapy

## 2024-04-09 NOTE — BRIEF OP NOTE
Brief Postoperative Note      Patient: Martha Burns  YOB: 1965  MRN: [id number]    Date of Procedure: 4/9/2024    Pre-Op Diagnosis Codes:     * Contracture of right knee [M24.561]    Post-Op Diagnosis: Same       Procedure(s):  RIGHT KNEE MANIPULATION UNDER ANESTHESIA    Surgeon(s):  Amilcar Holbrook DO    Assistant:  Physician Assistant: Derek Rogers PA-C    Anesthesia: Monitor Anesthesia Care    Estimated Blood Loss (mL): None    Complications: None    Specimens:   * No specimens in log *    Implants:  * No implants in log *      Drains: * No LDAs found *    Findings:  Infection Present At Time Of Surgery (PATOS) (choose all levels that have infection present):  No infection present  Other Findings: R knee stiffness    Electronically signed by AMILCAR HOLBROOK DO on 4/9/2024 at 7:37 AM

## 2024-04-09 NOTE — DISCHARGE INSTRUCTIONS
Follow Dr Esteves's printed instruction sheets.                [facility]  [phone]    Do not drive, work around machines or use equipment.  Do not drink any alcoholic beverages.  Do not smoke while alone.  Avoid making important decisions.  Plan to spend a quiet, relaxed evening @ home.  Resume normal activities as you begin to feel better.  Eat lightly for your first meal, then gradually increase your diet to what is normal for you.  In case of nausea, avoid food and drink only clear liquids.  Resume food as nausea ceases.  Notify your surgeon if you experience fever, chills, large amount of bleeding, difficulty breathing, persistent nausea and vomiting or any other disturbing problem.  Call for a follow-up appointment with your surgeon.

## 2024-04-09 NOTE — PROGRESS NOTES
0815- Discharge instructions provided to patient and boyfriend. Understanding voiced.  0822- Out to car per wheelchair. Home with boyfriend.

## 2024-04-09 NOTE — FLOWSHEET NOTE
.       Outpatient Physical Therapy  [place]           [x] Phone: [phone]   Fax: [phone]  [place]           [] Phone: [phone]   Fax: [phone]        Physical Therapy Daily Treatment Note  Date:  2024    Patient Name:  Martha Burns    :  1965  MRN: [id number]  Restrictions/Precautions: No data recorded   Position Activity Restriction  Other position/activity restrictions: No formal restrictions  Diagnosis:   S/P total knee arthroplasty, right [Z96.651] Diagnosis: R TKA  Date of Injury/Surgery: 24 -   Treatment Diagnosis:  R knee pain, impaired ROM and strength, antaligic gait, pain  Insurance/Certification information:  Pt approved for 50 units of each [id number]  53375  14562  75062  27969  67673  72153   24-24  Referring Physician:  Amilcar Esteves DO     PCP: Vahe Zamudio MD  Next Doctor Visit:  Manipulation scheduled for  in am.  Per pt, to be seen that day and 1x/wk for two weeks (not weekends)    Plan of care signed (Y/N):  Y  Outcome Measure:   Eval:  LEFS - 45% disability  24  LEFS = 25% disability  Six min walk = 377' in 3'08\", no AD  LEFS = 19% disability  Six min walk = 582' in 4 min, no AD    Visit# / total visits:   /         (14 visits previously)  Pain level:  5/10   Goals:        Patient goals: Walk w/o difficulty or pain    Short term goals  Time Frame for Short term goals: Defer to LTGs  Long Term Goals  Time Frame for Long Term Goals: In 4 weeks, patient will  Long Term Goals: 6 weeks   Long Term Goal 1: Pt will demo I with HEP/symptom management.   Long Term Goal 2: Pt will demo normal gait mechanics with min/no deficits to ease community mobility.   Long Term Goal 3: Pt will demo 0-120 deg knee A/PROM to ease transfers.   Long Term Goal 4: Pt will completed TUG <12 sec to demo improved mobility.   Long Term Goal 5: Pt will demo >68/80 LEFS to demo improved functional mobility.   Long Term Goal 6: Pt will demo 5x sit to stand <16

## 2024-04-09 NOTE — PROGRESS NOTES
Returned to room 19. Report received from Giselle ORTIZ and Antonette MARTINEZ. Drowsy. Responds appropriately. Respirations even and unlabored. Color pink. Able to move right leg without difficulty. Leg warm. Refuses beverage at thi time. Call light in reach. Significant other at bedside.

## 2024-04-09 NOTE — ANESTHESIA POSTPROCEDURE EVALUATION
Department of Anesthesiology  Postprocedure Note    Patient: Matrha Burns  MRN: [id number]  YOB: 1965  Date of evaluation: 4/9/2024    Procedure Summary       Date: 04/09/24 Room / Location: [address]    Anesthesia Start: 0732 Anesthesia Stop: 0745    Procedure: RIGHT KNEE MANIPULATION UNDER ANESTHESIA (Right: Knee) Diagnosis:       Contracture of right knee      (Contracture of right knee [M24.561])    Surgeons: Amilcar Esteves DO Responsible Provider: Shailesh Sahu MD    Anesthesia Type: MAC ASA Status: 3            Anesthesia Type: No value filed.    Abbey Phase I: Abbey Score: 10    Abbey Phase II:      Anesthesia Post Evaluation    Patient location during evaluation: bedside  Patient participation: complete - patient participated  Level of consciousness: responsive to verbal stimuli  Pain score: 0  Airway patency: patent  Nausea & Vomiting: no nausea and no vomiting  Cardiovascular status: blood pressure returned to baseline  Respiratory status: acceptable  Hydration status: euvolemic  Multimodal analgesia pain management approach  Pain management: adequate    No notable events documented.

## 2024-04-09 NOTE — H&P
clearance.  We will schedule surgery at soonest convenience.  Continue weight-bearing as tolerated.  Continue range of motion exercises as instructed.  Ice and elevate as needed.  Tylenol or Motrin for pain.  Follow up in office in 6 weeks.

## 2024-04-09 NOTE — OP NOTE
DATE OF PROCEDURE:  4/9/2024     PREOPERATIVE DIAGNOSIS:  Right total knee arthroplasty postoperative  stiffness.     POSTOPERATIVE DIAGNOSIS:  Right total knee arthroplasty postoperative  stiffness.     PROCEDURE:  Manipulation under anesthesia, right knee.     ATTENDING SURGEON:  Amilcar Esteves DO     ANESTHESIA:  MAC.     ESTIMATED BLOOD LOSS:  None.     FLUIDS:  300 mL of crystalloids.     INDICATION FOR PROCEDURE:  The patient is a 59-year-old female who  underwent right total knee arthroplasty approximately 3 months ago.   She was developing worsening decreased range of motion in the right knee  despite physical therapy.  Given her postoperative stiffness, I  recommended additional surgical treatment.  I explained the risks,  benefits, and possible complications of the procedure to the patient,  and after answering all of her questions, she consented to undergo the  above procedure.     DESCRIPTION OF PROCEDURE:  The patient was seen and evaluated in the  preop holding area where the right lower extremity was signed in her  presence.  At this point, care of the patient was turned over to  anesthesia team who transported her back to the operative suite.  She was  placed supine on the OR cart.  MAC anesthesia was applied, and once  adequate anesthesia was obtained, a timeout was performed and all in  attendance were in agreement.     I evaluated the right knee and found that she had full extension and was able to flex back to approximately 100  degrees.  I then applied firm pressure to the tibia flexing the knee and  was able slowly feel scar tissue release, at which point I was able to  flex her back to approximately 135 degrees.    I then  extended the knee and she remained with full extension.  I  then ran the knee through several ranges of motion and was able to get  her from 0 degrees of extension back to 135 degrees of flexion.  The  patient was then awakened from anesthesia and transported to PACU

## 2024-04-10 ENCOUNTER — HOSPITAL ENCOUNTER (OUTPATIENT)
Dept: PHYSICAL THERAPY | Age: 59
Setting detail: THERAPIES SERIES
Discharge: HOME | End: 2024-04-10
Payer: COMMERCIAL

## 2024-04-10 PROCEDURE — 97140 MANUAL THERAPY 1/> REGIONS: CPT

## 2024-04-10 PROCEDURE — 97110 THERAPEUTIC EXERCISES: CPT

## 2024-04-10 NOTE — FLOWSHEET NOTE
curls in //bars       Ant step ups 4\" 2x10 6\" 2 x 10  Unilat UE support     Hip abd 10 x 2 ea LE no UE support      HR 2x10      Lat step ups 4\" 2x10 6\" 2 x 10  Bilat UE support     Flex onto step    Knee flex stretch on step 10ct x 10 reps   Gait training  Ambulated 582', no AD, forward jeremy w/R toe off which increased w/distance.  VC/demo for heel><toe, focus on knee ext w/heel strike.     Sit><stand       Sled       Retro-gait       PROPRIOCEPTION       Wobble board        Cone taps       BOSU lunge                     MODALITIES       VASO Pt declined, will ice later at home NO  Will ice at home          *= marching, butt kicks (kicking sand), and retro walking    Other Therapeutic Activities/Education:    1/24/24:  POC and treatment rationale/progression expected reviewed with patient.  Recommended patient be sure to take pain medication prior to treatment as pain was limiting us today.    Home Exercise Program:    Access Code: [email or web address]  URL: [email or web address]  Date: 01/23/2024  Prepared by: Nicolette Da Silva    Exercises  - Supine Ankle Pumps  - 2 x daily - 7 x weekly - 2 sets - 10 reps  - Quad Setting and Stretching  - 2 x daily - 7 x weekly - 2 sets - 10 reps  - Supine Knee Extension Strengthening  - 2 x daily - 7 x weekly - 2 sets - 10 reps  - Supine Heel Slide  - 2 x daily - 7 x weekly - 2 sets - 10 reps  - Supine Heel Slide with Strap  - 2 x daily - 7 x weekly - 2 sets - 5 reps  - Active Straight Leg Raise with Quad Set  - 2 x daily - 7 x weekly - 2 sets - 10 reps  - Supine Hip Abduction  - 2 x daily - 7 x weekly - 2 sets - 10 reps  - Seated Long Arc Quad  - 2 x daily - 7 x weekly - 2 sets - 10 reps  - Seated Hamstring Stretch  - 2-3 x daily - 7 x weekly - 1 sets - 3 reps  - Standing Ankle Dorsiflexion Stretch  - 2-3 x daily - 7 x weekly - 1 sets - 3 reps    Access Code: [email or web address]  URL: [email or web address]  Date: 01/30/2024  Prepared by: Nicolette Maistros    Exercises  - Seated Hamstring

## 2024-04-11 ENCOUNTER — HOSPITAL ENCOUNTER (OUTPATIENT)
Dept: PHYSICAL THERAPY | Age: 59
Setting detail: THERAPIES SERIES
Discharge: HOME | End: 2024-04-11
Payer: COMMERCIAL

## 2024-04-11 PROCEDURE — 97110 THERAPEUTIC EXERCISES: CPT

## 2024-04-11 PROCEDURE — 97140 MANUAL THERAPY 1/> REGIONS: CPT

## 2024-04-11 NOTE — FLOWSHEET NOTE
Other:      1           Total for episode of care   48       Timed Code/Total Treatment Minutes 49/49, (2) TE, (1) MT     Next Progress Note due:  4/21 or 10 visits    Plan of Care Interventions:  [x] Therapeutic Exercise  [x] Modalities:  [x] Therapeutic Activity     [] Ultrasound  [x] Estim  [x] Gait Training      [] Cervical Traction [] Lumbar Traction  [x] Neuromuscular Re-education    [] Cold/hotpack [] Iontophoresis   [x] Instruction in HEP      [x] Vasopneumatic   [] Dry Needling    [x] Manual Therapy               [x] Aquatic Therapy - will consider            Electronically signed by:  Mariza Kam , JUANJO     4/11/2024 3:34 PM     
None

## 2024-04-12 ENCOUNTER — HOSPITAL ENCOUNTER (OUTPATIENT)
Dept: PHYSICAL THERAPY | Age: 59
Setting detail: THERAPIES SERIES
Discharge: HOME | End: 2024-04-12
Payer: COMMERCIAL

## 2024-04-12 PROCEDURE — 97140 MANUAL THERAPY 1/> REGIONS: CPT

## 2024-04-12 PROCEDURE — 97110 THERAPEUTIC EXERCISES: CPT

## 2024-04-12 NOTE — FLOWSHEET NOTE
2nd message left.     HR      Lat step ups      Flex onto step Knee flex stretch on step 10ct x 10 reps Knee flex stretch on step 10ct x 10 reps          Gait training      Sit><stand      Sled      Retro-gait      PROPRIOCEPTION      Wobble board       Cone taps      BOSU lunge                  MODALITIES      VASO Will ice at home  --         *= marching, butt kicks (kicking sand), and retro walking    Other Therapeutic Activities/Education:    1/24/24:  POC and treatment rationale/progression expected reviewed with patient.  Recommended patient be sure to take pain medication prior to treatment as pain was limiting us today.    Educated on use of re-usable shopping bag with heavy resistance for 30-1' increments. Pt reported understanding.      Home Exercise Program:    Access Code: [email or web address]  URL: [email or web address]  Date: 01/23/2024  Prepared by: Nicolette Da Silva    Exercises  - Supine Ankle Pumps  - 2 x daily - 7 x weekly - 2 sets - 10 reps  - Quad Setting and Stretching  - 2 x daily - 7 x weekly - 2 sets - 10 reps  - Supine Knee Extension Strengthening  - 2 x daily - 7 x weekly - 2 sets - 10 reps  - Supine Heel Slide  - 2 x daily - 7 x weekly - 2 sets - 10 reps  - Supine Heel Slide with Strap  - 2 x daily - 7 x weekly - 2 sets - 5 reps  - Active Straight Leg Raise with Quad Set  - 2 x daily - 7 x weekly - 2 sets - 10 reps  - Supine Hip Abduction  - 2 x daily - 7 x weekly - 2 sets - 10 reps  - Seated Long Arc Quad  - 2 x daily - 7 x weekly - 2 sets - 10 reps  - Seated Hamstring Stretch  - 2-3 x daily - 7 x weekly - 1 sets - 3 reps  - Standing Ankle Dorsiflexion Stretch  - 2-3 x daily - 7 x weekly - 1 sets - 3 reps    Access Code: [email or web address]  URL: [email or web address]  Date: 01/30/2024  Prepared by: Nicolette Da Silva    Exercises  - Seated Hamstring Stretch  - 3 x daily - 7 x weekly - 1 sets - 3 reps  - Seated Knee Flexion AAROM  - 3 x daily - 7 x weekly - 1 sets - 10 reps  Access Code: [email or web address]  URL:

## 2024-04-16 ENCOUNTER — HOSPITAL ENCOUNTER (OUTPATIENT)
Dept: PHYSICAL THERAPY | Age: 59
Setting detail: THERAPIES SERIES
Discharge: HOME | End: 2024-04-16
Payer: COMMERCIAL

## 2024-04-16 PROCEDURE — 97140 MANUAL THERAPY 1/> REGIONS: CPT

## 2024-04-16 PROCEDURE — 97110 THERAPEUTIC EXERCISES: CPT

## 2024-04-16 NOTE — FLOWSHEET NOTE
.       Outpatient Physical Therapy  [place]           [x] Phone: [phone]   Fax: [phone]  [place]           [] Phone: [phone]   Fax: [phone]        Physical Therapy Daily Treatment Note  Date:  2024    Patient Name:  Martha Burns    :  1965  MRN: [id number]  Restrictions/Precautions: No data recorded   Position Activity Restriction  Other position/activity restrictions: No formal restrictions  Diagnosis:   S/P total knee arthroplasty, right [Z96.651] Diagnosis: R TKA  Date of Injury/Surgery: 24 -   Treatment Diagnosis:  R knee pain, impaired ROM and strength, antaligic gait, pain  Insurance/Certification information:        Pt approved previously from 24-24 for 50 units,     She is now approved from 24-24 for 40 units,         Pt is still only allowed to have [facility],  visit limit will over ride units.    As of  she has had 19 visits for the year .                Referring Physician:  Amilcar Esteves DO     PCP: Vahe Zamudio MD  Next Doctor Visit:  Manipulation scheduled for  in am.  Per pt, to be seen that day and 1x/wk for two weeks (not weekends)    Plan of care signed (Y/N):  Y  Outcome Measure:   Eval:  LEFS - 45% disability  24  LEFS = 25% disability  Six min walk = 377' in 3'08\", no AD  LEFS = 19% disability  Six min walk = 582' in 4 min, no AD    Visit# / total visits:         (14 visits previously)  Pain level:   4/10   Goals:        Patient goals: Walk w/o difficulty or pain    Short term goals  Time Frame for Short term goals: Defer to LTGs  Long Term Goals  Time Frame for Long Term Goals: In 4 weeks, patient will  Long Term Goals: 6 weeks   Long Term Goal 1: Pt will demo I with HEP/symptom management.   Long Term Goal 2: Pt will demo normal gait mechanics with min/no deficits to ease community mobility.   Long Term Goal 3: Pt will demo 0-120 deg knee A/PROM to ease

## 2024-04-17 ENCOUNTER — HOSPITAL ENCOUNTER (OUTPATIENT)
Dept: PHYSICAL THERAPY | Age: 59
Setting detail: THERAPIES SERIES
Discharge: HOME | End: 2024-04-17
Payer: COMMERCIAL

## 2024-04-17 PROCEDURE — 97110 THERAPEUTIC EXERCISES: CPT

## 2024-04-17 PROCEDURE — 97140 MANUAL THERAPY 1/> REGIONS: CPT

## 2024-04-17 NOTE — FLOWSHEET NOTE
Rx this date and running total for each and overall total for Rx to date:  (No billed tx allowed day of eval)    50 units of each 22658  14646  92570  01040  26732  92483  71628    CPT Code Units today Running Total Units Total approved    TE 93471  1  28 50   MAN 99912  2 16 50   Gait 19879  1 50   NR 54859  8 50   TA  61912  2 50   Estim Unatt 63052    50   US 59234      [facility] [place] [address]      VASO 66014   4 50   ADL/Self care 11229      DNT 1-2 20560      DNT 3-4 20561      Other:      1           Total for episode of care   51       Timed Code/Total Treatment Minutes 42/42'     (1) 17' TE, (25') MT     Next Progress Note due:  4/21 or 10 visits    Plan of Care Interventions:  [x] Therapeutic Exercise  [x] Modalities:  [x] Therapeutic Activity     [] Ultrasound  [x] Estim  [x] Gait Training      [] Cervical Traction [] Lumbar Traction  [x] Neuromuscular Re-education    [] Cold/hotpack [] Iontophoresis   [x] Instruction in HEP      [x] Vasopneumatic   [] Dry Needling    [x] Manual Therapy               [x] Aquatic Therapy - will consider            Electronically signed by:  Dustin Goel, PT , DPT, OCS     4/17/2024 8:21 AM

## 2024-04-18 ENCOUNTER — HOSPITAL ENCOUNTER (OUTPATIENT)
Dept: PHYSICAL THERAPY | Age: 59
Setting detail: THERAPIES SERIES
Discharge: HOME | End: 2024-04-18
Payer: COMMERCIAL

## 2024-04-18 PROCEDURE — 97110 THERAPEUTIC EXERCISES: CPT

## 2024-04-18 PROCEDURE — 97140 MANUAL THERAPY 1/> REGIONS: CPT

## 2024-04-18 NOTE — FLOWSHEET NOTE
.       Outpatient Physical Therapy  [place]           [x] Phone: [phone]   Fax: [phone]  [place]           [] Phone: [phone]   Fax: [phone]        Physical Therapy Daily Treatment Note  Date:  2024    Patient Name:  Martha Burns    :  1965  MRN: [id number]  Restrictions/Precautions: No data recorded   Position Activity Restriction  Other position/activity restrictions: No formal restrictions  Diagnosis:   S/P total knee arthroplasty, right [Z96.651] Diagnosis: R TKA  Date of Injury/Surgery: 24 -   Treatment Diagnosis:  R knee pain, impaired ROM and strength, antaligic gait, pain  Insurance/Certification information:        Pt approved previously from 24-24 for 50 units,     She is now approved from 24-24 for 40 units,         Pt is still only allowed to have [facility] per calendar [facility],  visit limit will over ride units.    As of  she has had 19 visits for the year .                Referring Physician:  Amilcar Esteves DO     PCP: Vahe Zamudio MD  Next Doctor Visit:  Manipulation scheduled for  in am.  Per pt, to be seen that day and 1x/wk for two weeks (not weekends)    Plan of care signed (Y/N):  Y  Outcome Measure:   Eval:  LEFS - 45% disability  24  LEFS = 25% disability  Six min walk = 377' in 3'08\", no AD  LEFS = 19% disability  Six min walk = 582' in 4 min, no AD    Visit# / total visits:         (14 visits previously)  Pain level:  3 /10   Goals:        Patient goals: Walk w/o difficulty or pain    Short term goals  Time Frame for Short term goals: Defer to LTGs  Long Term Goals  Time Frame for Long Term Goals: In 4 weeks, patient will  Long Term Goals: 6 weeks   Long Term Goal 1: Pt will demo I with HEP/symptom management.   Long Term Goal 2: Pt will demo normal gait mechanics with min/no deficits to ease community mobility.   Long Term Goal 3: Pt will demo 0-120 deg knee A/PROM to ease 
In / Time Out: 4631-4506    If [employer] Please Indicate Units for Rx this date and running total for each and overall total for Rx to date:  (No billed tx allowed day of eval)    50 units of each 59539  61962  13407  35715  26835  07635  28181    CPT Code Units today Running Total Units Total approved    TE 55288  1  28 50   MAN 64070  2 16 50   Gait 60454  1 50   NR 72423  8 50   TA  92898  2 50   Estim Unatt 20227    50   US [address]      [facility] [place] [address]      VASO 48023   4 50   ADL/Self care 54327      DNT 1-2 20560      DNT 3-4 20561      Other:      1           Total for episode of care   51       Timed Code/Total Treatment Minutes 42/42'     (1) 17' TE, (25') MT     Next Progress Note due:  4/21 or 10 visits    Plan of Care Interventions:  [x] Therapeutic Exercise  [x] Modalities:  [x] Therapeutic Activity     [] Ultrasound  [x] Estim  [x] Gait Training      [] Cervical Traction [] Lumbar Traction  [x] Neuromuscular Re-education    [] Cold/hotpack [] Iontophoresis   [x] Instruction in HEP      [x] Vasopneumatic   [] Dry Needling    [x] Manual Therapy               [x] Aquatic Therapy - will consider            Electronically signed by:  Dustin Goel, PT , DPT, OCS     4/18/2024 10:44 AM

## 2024-04-19 ENCOUNTER — HOSPITAL ENCOUNTER (OUTPATIENT)
Dept: PHYSICAL THERAPY | Age: 59
Setting detail: THERAPIES SERIES
Discharge: HOME | End: 2024-04-19
Payer: COMMERCIAL

## 2024-04-19 PROCEDURE — 97140 MANUAL THERAPY 1/> REGIONS: CPT

## 2024-04-19 PROCEDURE — 97110 THERAPEUTIC EXERCISES: CPT

## 2024-04-19 NOTE — FLOWSHEET NOTE
.       Outpatient Physical Therapy  [place]           [x] Phone: [phone]   Fax: [phone]  [place]           [] Phone: [phone]   Fax: [phone]        Physical Therapy Daily Treatment Note  Date:  2024    Patient Name:  Martha Burns    :  1965  MRN: [id number]  Restrictions/Precautions: No data recorded   Position Activity Restriction  Other position/activity restrictions: No formal restrictions  Diagnosis:   S/P total knee arthroplasty, right [Z96.651] Diagnosis: R TKA  Date of Injury/Surgery: 24 -   Treatment Diagnosis:  R knee pain, impaired ROM and strength, antaligic gait, pain  Insurance/Certification information:        Pt approved previously from 24-24 for 50 units,     She is now approved from 24-24 for 40 units,         Pt is still only allowed to have [facility],  visit limit will over ride units.    As of  she has had 19 visits for the year .                Referring Physician:  Amilcar Esteves DO     PCP: Vahe Zamudio MD  Next Doctor Visit:  Manipulation scheduled for  in am.  Per pt, to be seen that day and 1x/wk for two weeks (not weekends)    Plan of care signed (Y/N):  Y  Outcome Measure:   Eval:  LEFS - 45% disability  24  LEFS = 25% disability  Six min walk = 377' in 3'08\", no AD  LEFS = 19% disability  Six min walk = 582' in 4 min, no AD    Visit# / total visits:         (14 visits previously)  Pain level:   3/10 knee, 5/10 hip         Goals:        Patient goals: Walk w/o difficulty or pain    Short term goals  Time Frame for Short term goals: Defer to LTGs  Long Term Goals  Time Frame for Long Term Goals: In 4 weeks, patient will  Long Term Goals: 6 weeks   Long Term Goal 1: Pt will demo I with HEP/symptom management.   Long Term Goal 2: Pt will demo normal gait mechanics with min/no deficits to ease community mobility.   Long Term Goal 3: Pt will demo 0-120 deg knee

## 2024-04-24 ENCOUNTER — OFFICE VISIT (OUTPATIENT)
Dept: ORTHOPEDIC SURGERY | Age: 59
End: 2024-04-24

## 2024-04-24 VITALS — OXYGEN SATURATION: 96 % | TEMPERATURE: 97.2 F | HEART RATE: 78 BPM

## 2024-04-24 DIAGNOSIS — Z96.651 S/P TOTAL KNEE ARTHROPLASTY, RIGHT: Primary | ICD-10-CM

## 2024-04-24 PROCEDURE — 99024 POSTOP FOLLOW-UP VISIT: CPT | Performed by: PHYSICIAN ASSISTANT

## 2024-04-24 NOTE — PROGRESS NOTES
Date of surgery:   4/9/2024  Surgeon: Dr. Esteves    History:  Ms. Martha Burns is here in follow up regarding her right total knee arthroplasty.  She is also following up on the manipulation under anesthesia of the knee which she states has helped tremendously.    Physical:   Vitals:    04/24/24 1528   Pulse: 78   Temp: 97.2 °F (36.2 °C)   SpO2: 96%     Right knee:  Range of motion 5-115 degrees  No varus or valgus instability  No significant edema in the right lower extremity    Imaging studies: 3 views of the right knee taken and reviewed in the office today show a well positioned cemented right total knee arthroplasty with no signs of loosening or periprosthetic fracture.    Impression: Status post above, doing well       Plan:   Continue working on range of motion of the right knee daily.  Patient Instructions   Follow up with demetria year shara appt

## 2024-04-25 ENCOUNTER — HOSPITAL ENCOUNTER (OUTPATIENT)
Dept: PHYSICAL THERAPY | Age: 59
Discharge: HOME | End: 2024-04-25

## 2024-04-25 NOTE — FLOWSHEET NOTE
Patient met with her ortho doctor yesterday who advised that she did not need to do todays appointment. Called to cancel.

## 2024-04-25 NOTE — FLOWSHEET NOTE
Physical Therapy  Cancellation/No-show Note  Patient Name:  Martha Burns  :  1965   Date:  2024  Cancelled visits to date: 1  No-shows to date: 1    For today's appointment patient:  [x]  Cancelled  []  Rescheduled appointment  []  No-show     Reason given by patient:  []  Patient ill  []  Conflicting appointment  []  No transportation    []  Conflict with work  []  No reason given  []  Other:     Comments:  Patient met with her ortho doctor yesterday who advised that she did not need to do todays appointment. Called to cancel.     Electronically signed by:  Dustni Goel, PT, DPT ,OCS     2024 3:21 PM

## 2024-05-07 ENCOUNTER — HOSPITAL ENCOUNTER (OUTPATIENT)
Dept: PHYSICAL THERAPY | Age: 59
Discharge: HOME | End: 2024-05-07

## 2024-05-07 NOTE — FLOWSHEET NOTE
Patient called to cancel todays appointment she is not able to make it on time. She does not wish to reschedule any more PT at this time.

## 2024-05-07 NOTE — FLOWSHEET NOTE
Physical Therapy  Cancellation/No-show Note  Patient Name:  Martha Burns  :  1965   Date:  2024  Cancelled visits to date: 2  No-shows to date: 1    For today's appointment patient:  [x]  Cancelled  []  Rescheduled appointment  []  No-show     Reason given by patient:  []  Patient ill  []  Conflicting appointment  []  No transportation    []  Conflict with work  []  No reason given  []  Other:     Comments:  Will discharge, did not want to reschedule.     Electronically signed by:  Dustin Goel, PT, DPT ,OCS     2024 3:36 PM

## 2024-07-15 ENCOUNTER — OFFICE VISIT (OUTPATIENT)
Dept: ORTHOPEDIC SURGERY | Age: 59
End: 2024-07-15
Payer: COMMERCIAL

## 2024-07-15 VITALS
WEIGHT: 163 LBS | HEIGHT: 62 IN | HEART RATE: 101 BPM | BODY MASS INDEX: 30 KG/M2 | RESPIRATION RATE: 14 BRPM | OXYGEN SATURATION: 97 %

## 2024-07-15 DIAGNOSIS — M70.61 TROCHANTERIC BURSITIS OF RIGHT HIP: Primary | ICD-10-CM

## 2024-07-15 PROCEDURE — 3017F COLORECTAL CA SCREEN DOC REV: CPT | Performed by: PHYSICIAN ASSISTANT

## 2024-07-15 PROCEDURE — G8427 DOCREV CUR MEDS BY ELIG CLIN: HCPCS | Performed by: PHYSICIAN ASSISTANT

## 2024-07-15 PROCEDURE — G8417 CALC BMI ABV UP PARAM F/U: HCPCS | Performed by: PHYSICIAN ASSISTANT

## 2024-07-15 PROCEDURE — 99214 OFFICE O/P EST MOD 30 MIN: CPT | Performed by: PHYSICIAN ASSISTANT

## 2024-07-15 PROCEDURE — 1036F TOBACCO NON-USER: CPT | Performed by: PHYSICIAN ASSISTANT

## 2024-07-15 PROCEDURE — 20610 DRAIN/INJ JOINT/BURSA W/O US: CPT | Performed by: PHYSICIAN ASSISTANT

## 2024-07-15 RX ORDER — TRIAMCINOLONE ACETONIDE 40 MG/ML
80 INJECTION, SUSPENSION INTRA-ARTICULAR; INTRAMUSCULAR ONCE
Status: COMPLETED | OUTPATIENT
Start: 2024-07-15 | End: 2024-07-15

## 2024-07-15 RX ADMIN — TRIAMCINOLONE ACETONIDE 80 MG: 40 INJECTION, SUSPENSION INTRA-ARTICULAR; INTRAMUSCULAR at 14:46

## 2024-07-15 NOTE — PATIENT INSTRUCTIONS
Continue weight-bearing as tolerated.  Continue range of motion exercises as instructed.  Ice and elevate as needed.  Tylenol or Motrin for pain.  Injection given into the right hip.  Follow up as needed.    We are committed to providing you the best care possible.  If you receive a survey after visiting one of our offices, please take time to share your experience concerning your physician office visit.  These surveys are confidential and no health information about you is shared.  We are eager to improve for you and we are counting on your feedback to help make that happen.

## 2024-07-17 ASSESSMENT — ENCOUNTER SYMPTOMS
GASTROINTESTINAL NEGATIVE: 1
RESPIRATORY NEGATIVE: 1
EYES NEGATIVE: 1

## 2024-07-17 NOTE — PROGRESS NOTES
the patient today that she likely has trochanteric bursitis of the right hip which may or may not have been exacerbated due to realignment of the right knee which she had back in January 2024.  I explained that we could do a steroid injection in the right bursa and she agreed to try that.    right hip greater trochanteric bursa injection procedure note    Pre-procedure diagnosis:  right hip greater trochanteric bursitis    Post-procedure diagnosis:  same    Procedure: The planned procedure/risks/benefits/alternatives were discussed with the patient.  Risks include, but are not limited to, increased pain, drug reaction, infection, bleeding, lack of improvement, neurovascular injury, and increased blood sugar levels.  The patient understood and all of their questions were answered.    The lateral hip was prepped with alcohol and then anesthetized with Ethyl Chloride spray.  A 22 gauge needle was advanced into the greater trochanteric bursa  without difficulty.  The bursa was then injected with 3 ml 1% lidocaine, 3 mL 0.5% bupivacaine, 2 ml of kenalog 40 mg/ml. The injection site was cleansed with isopropyl alcohol and a band-aid was placed.      Complications:  None, the patient tolerated the procedure well.    Instructions:  The patient was advised to rest the hip and decrease activity for the next 24 to 48 hours. May use prescription or OTC pain relievers as needed for any post-injection pain as well as ice.  Follow blood sugars for the next few days.

## 2025-01-31 ENCOUNTER — OFFICE VISIT (OUTPATIENT)
Dept: ORTHOPEDIC SURGERY | Age: 60
End: 2025-01-31

## 2025-01-31 VITALS
WEIGHT: 165 LBS | BODY MASS INDEX: 30.36 KG/M2 | OXYGEN SATURATION: 100 % | HEIGHT: 62 IN | RESPIRATION RATE: 14 BRPM | HEART RATE: 95 BPM

## 2025-01-31 DIAGNOSIS — Z96.651 HISTORY OF TOTAL KNEE ARTHROPLASTY, RIGHT: Primary | ICD-10-CM

## 2025-01-31 ASSESSMENT — ENCOUNTER SYMPTOMS
GASTROINTESTINAL NEGATIVE: 1
EYES NEGATIVE: 1
RESPIRATORY NEGATIVE: 1

## 2025-01-31 NOTE — PATIENT INSTRUCTIONS
Continue weight-bearing as tolerated.  Continue range of motion exercises as instructed.  Ice and elevate as needed.  Tylenol or Motrin for pain.  Follow up as needed for the right knee.  Call office once ready for the discussion of the left knee with Dr. Lilly    We are committed to providing you the best care possible.  If you receive a survey after visiting one of our offices, please take time to share your experience concerning your physician office visit.  These surveys are confidential and no health information about you is shared.  We are eager to improve for you and we are counting on your feedback to help make that happen.

## 2025-01-31 NOTE — PROGRESS NOTES
Review of Systems   Constitutional: Negative.    HENT: Negative.     Eyes: Negative.    Respiratory: Negative.     Cardiovascular: Negative.    Gastrointestinal: Negative.    Genitourinary: Negative.    Musculoskeletal: Negative.    Skin: Negative.    Neurological: Negative.    Psychiatric/Behavioral: Negative.           HPI:  Martha Burns is a 59 y.o. female that presents to the office today following up 1 year after her right total knee arthroplasty.  She states that overall she is happy she had it done but she does have stiffness in the right knee and she does get pain in her right lateral hip at times.  She did have a steroid injection in her trochanteric bursa and that helped the pain but the pain is now coming back.    Past Medical History:   Diagnosis Date    Diabetes mellitus (HCC)     Type II - follows with PCP    History of nuclear stress test 06/23/2016    cardiolite-normal,EF65%    Hx of echocardiogram 06/23/2016    EF >55%. Normal chamber sizes. LVH with normal LVSF. Mild MR/TR. Normal sized abdominal aorta at 2.1cm.    Hyperlipidemia     Hypertension     Obesity (BMI 30-39.9)     Shoulder pain, bilateral     SOB (shortness of breath)        Past Surgical History:   Procedure Laterality Date    COLONOSCOPY  2021    Normal exam per pt    FOOT ARTHROPLASTY Bilateral     \"relieve pressure from toes\"    HYSTERECTOMY (CERVIX STATUS UNKNOWN)  2009    KNEE SURGERY Right 4/9/2024    RIGHT KNEE MANIPULATION UNDER ANESTHESIA performed by Amilcar Esteves DO at [facility] OR    TOTAL KNEE ARTHROPLASTY Right 1/4/2024    RIGHT KNEE TOTAL ARTHROPLASTY performed by Amilcar Esteves DO at [facility] OR       History reviewed. No pertinent family history.    Social History     Socioeconomic History    Marital status:      Spouse name: None    Number of children: None    Years of education: None    Highest education level: None   Tobacco Use    Smoking status: Never    Smokeless tobacco: Never   Vaping Use    Vaping

## (undated) DEVICE — CIRCUIT ANES AD CUST